# Patient Record
Sex: MALE | Race: WHITE | NOT HISPANIC OR LATINO
[De-identification: names, ages, dates, MRNs, and addresses within clinical notes are randomized per-mention and may not be internally consistent; named-entity substitution may affect disease eponyms.]

---

## 2017-01-12 ENCOUNTER — APPOINTMENT (OUTPATIENT)
Dept: FAMILY MEDICINE | Facility: CLINIC | Age: 72
End: 2017-01-12

## 2017-01-12 VITALS
HEART RATE: 64 BPM | BODY MASS INDEX: 27.39 KG/M2 | SYSTOLIC BLOOD PRESSURE: 110 MMHG | DIASTOLIC BLOOD PRESSURE: 60 MMHG | WEIGHT: 200 LBS | HEIGHT: 71.5 IN

## 2017-01-12 DIAGNOSIS — M25.532 PAIN IN LEFT WRIST: ICD-10-CM

## 2017-03-01 ENCOUNTER — NON-APPOINTMENT (OUTPATIENT)
Age: 72
End: 2017-03-01

## 2017-03-01 ENCOUNTER — APPOINTMENT (OUTPATIENT)
Dept: FAMILY MEDICINE | Facility: CLINIC | Age: 72
End: 2017-03-01

## 2017-03-01 VITALS
WEIGHT: 215 LBS | RESPIRATION RATE: 16 BRPM | HEIGHT: 71 IN | DIASTOLIC BLOOD PRESSURE: 66 MMHG | BODY MASS INDEX: 30.1 KG/M2 | HEART RATE: 66 BPM | OXYGEN SATURATION: 98 % | SYSTOLIC BLOOD PRESSURE: 132 MMHG

## 2017-03-01 DIAGNOSIS — Z87.891 PERSONAL HISTORY OF NICOTINE DEPENDENCE: ICD-10-CM

## 2017-03-01 DIAGNOSIS — H10.9 UNSPECIFIED CONJUNCTIVITIS: ICD-10-CM

## 2017-03-02 LAB
ALBUMIN SERPL ELPH-MCNC: 4.6 G/DL
ALP BLD-CCNC: 59 U/L
ALT SERPL-CCNC: 20 U/L
ANION GAP SERPL CALC-SCNC: 16 MMOL/L
AST SERPL-CCNC: 24 U/L
BILIRUB SERPL-MCNC: 0.6 MG/DL
BUN SERPL-MCNC: 13 MG/DL
CALCIUM SERPL-MCNC: 9.7 MG/DL
CHLORIDE SERPL-SCNC: 98 MMOL/L
CHOLEST SERPL-MCNC: 283 MG/DL
CHOLEST/HDLC SERPL: 5 RATIO
CO2 SERPL-SCNC: 24 MMOL/L
CREAT SERPL-MCNC: 1.01 MG/DL
GLUCOSE SERPL-MCNC: 118 MG/DL
HBA1C MFR BLD HPLC: 5.5 %
HDLC SERPL-MCNC: 57 MG/DL
LDLC SERPL CALC-MCNC: 173 MG/DL
POTASSIUM SERPL-SCNC: 4.1 MMOL/L
PROT SERPL-MCNC: 7.3 G/DL
SODIUM SERPL-SCNC: 138 MMOL/L
TRIGL SERPL-MCNC: 265 MG/DL

## 2017-03-10 ENCOUNTER — TRANSCRIPTION ENCOUNTER (OUTPATIENT)
Age: 72
End: 2017-03-10

## 2017-05-31 ENCOUNTER — RX RENEWAL (OUTPATIENT)
Age: 72
End: 2017-05-31

## 2017-06-16 ENCOUNTER — RX RENEWAL (OUTPATIENT)
Age: 72
End: 2017-06-16

## 2017-06-16 ENCOUNTER — APPOINTMENT (OUTPATIENT)
Dept: FAMILY MEDICINE | Facility: CLINIC | Age: 72
End: 2017-06-16

## 2017-06-16 ENCOUNTER — LABORATORY RESULT (OUTPATIENT)
Age: 72
End: 2017-06-16

## 2017-06-16 VITALS
DIASTOLIC BLOOD PRESSURE: 70 MMHG | SYSTOLIC BLOOD PRESSURE: 138 MMHG | HEIGHT: 71 IN | RESPIRATION RATE: 16 BRPM | OXYGEN SATURATION: 98 % | WEIGHT: 215 LBS | BODY MASS INDEX: 30.1 KG/M2 | TEMPERATURE: 97.6 F | HEART RATE: 76 BPM

## 2017-06-17 LAB — B BURGDOR IGG+IGM SER QL IB: NORMAL

## 2017-06-19 ENCOUNTER — RX RENEWAL (OUTPATIENT)
Age: 72
End: 2017-06-19

## 2017-09-18 ENCOUNTER — MED ADMIN CHARGE (OUTPATIENT)
Age: 72
End: 2017-09-18

## 2017-09-18 ENCOUNTER — APPOINTMENT (OUTPATIENT)
Dept: FAMILY MEDICINE | Facility: CLINIC | Age: 72
End: 2017-09-18
Payer: MEDICARE

## 2017-09-18 PROCEDURE — 90662 IIV NO PRSV INCREASED AG IM: CPT

## 2017-09-18 PROCEDURE — G0008: CPT

## 2018-01-02 ENCOUNTER — RX RENEWAL (OUTPATIENT)
Age: 73
End: 2018-01-02

## 2018-01-03 ENCOUNTER — RX RENEWAL (OUTPATIENT)
Age: 73
End: 2018-01-03

## 2018-02-24 ENCOUNTER — RX RENEWAL (OUTPATIENT)
Age: 73
End: 2018-02-24

## 2018-04-18 ENCOUNTER — RX RENEWAL (OUTPATIENT)
Age: 73
End: 2018-04-18

## 2018-04-19 ENCOUNTER — RX RENEWAL (OUTPATIENT)
Age: 73
End: 2018-04-19

## 2018-05-30 ENCOUNTER — APPOINTMENT (OUTPATIENT)
Dept: FAMILY MEDICINE | Facility: CLINIC | Age: 73
End: 2018-05-30
Payer: MEDICARE

## 2018-05-30 VITALS
WEIGHT: 193 LBS | DIASTOLIC BLOOD PRESSURE: 72 MMHG | BODY MASS INDEX: 27.02 KG/M2 | HEIGHT: 71 IN | SYSTOLIC BLOOD PRESSURE: 130 MMHG

## 2018-05-30 PROCEDURE — 99213 OFFICE O/P EST LOW 20 MIN: CPT

## 2018-05-30 NOTE — HISTORY OF PRESENT ILLNESS
[FreeTextEntry8] : tick bite removed 2 days ago from right thigh states prob short duration no fever rash or joint pain

## 2018-06-06 ENCOUNTER — RX RENEWAL (OUTPATIENT)
Age: 73
End: 2018-06-06

## 2018-06-25 ENCOUNTER — APPOINTMENT (OUTPATIENT)
Dept: FAMILY MEDICINE | Facility: CLINIC | Age: 73
End: 2018-06-25
Payer: MEDICARE

## 2018-06-25 ENCOUNTER — NON-APPOINTMENT (OUTPATIENT)
Age: 73
End: 2018-06-25

## 2018-06-25 VITALS
BODY MASS INDEX: 27.21 KG/M2 | HEIGHT: 71 IN | DIASTOLIC BLOOD PRESSURE: 80 MMHG | SYSTOLIC BLOOD PRESSURE: 130 MMHG | WEIGHT: 194.38 LBS

## 2018-06-25 DIAGNOSIS — E78.5 HYPERLIPIDEMIA, UNSPECIFIED: ICD-10-CM

## 2018-06-25 PROCEDURE — G0439: CPT

## 2018-06-25 PROCEDURE — 36415 COLL VENOUS BLD VENIPUNCTURE: CPT

## 2018-06-25 PROCEDURE — 93000 ELECTROCARDIOGRAM COMPLETE: CPT | Mod: 59

## 2018-06-25 RX ORDER — DOXYCYCLINE HYCLATE 100 MG/1
100 CAPSULE ORAL
Qty: 2 | Refills: 0 | Status: DISCONTINUED | COMMUNITY
Start: 2017-06-16 | End: 2018-06-25

## 2018-06-25 NOTE — PHYSICAL EXAM
[No Acute Distress] : no acute distress [No Lymphadenopathy] : no lymphadenopathy [Thyroid Normal, No Nodules] : the thyroid was normal and there were no nodules present [Regular Rhythm] : with a regular rhythm [No Murmur] : no murmur heard [No Carotid Bruits] : no carotid bruits [Pedal Pulses Present] : the pedal pulses are present [No Edema] : there was no peripheral edema [Soft] : abdomen soft [No HSM] : no HSM [No Hernias] : no hernias [Normal Sphincter Tone] : normal sphincter tone [Penis Abnormality] : normal circumcised penis [Testes Mass (___cm)] : there were no testicular masses [Prostate Tenderness] : the prostate was not tender [No Prostate Nodules] : no prostate nodules

## 2018-06-25 NOTE — HEALTH RISK ASSESSMENT
[Very Good] : ~his/her~  mood as very good [] : No [No falls in past year] : Patient reported no falls in the past year [0] : 2) Feeling down, depressed, or hopeless: Not at all (0) [de-identified] : Social [Patient reported colonoscopy was normal] : Patient reported colonoscopy was normal [Change in mental status noted] : No change in mental status noted [None] : None [Retired] : retired [] :  [Fully functional (bathing, dressing, toileting, transferring, walking, feeding)] : Fully functional (bathing, dressing, toileting, transferring, walking, feeding) [Fully functional (using the telephone, shopping, preparing meals, housekeeping, doing laundry, using] : Fully functional and needs no help or supervision to perform IADLs (using the telephone, shopping, preparing meals, housekeeping, doing laundry, using transportation, managing medications and managing finances) [Seat Belt] :  uses seat belt [ColonoscopyDate] : 2014 [Aggressive treatment] : aggressive treatment

## 2018-06-25 NOTE — HISTORY OF PRESENT ILLNESS
[de-identified] : Here for general checkup.\par \par Hypertension well controlled on current meds. Patient began Crestor last fall. Has not checked lipids since medication initiated. Patient walks regularly. No cardiovascular symptoms. Feels well. Nocturia x1 good stream. Colonoscopy up to date

## 2018-06-26 LAB
ALBUMIN SERPL ELPH-MCNC: 4.7 G/DL
ALP BLD-CCNC: 55 U/L
ALT SERPL-CCNC: 31 U/L
ANION GAP SERPL CALC-SCNC: 14 MMOL/L
AST SERPL-CCNC: 31 U/L
BILIRUB SERPL-MCNC: 0.6 MG/DL
BUN SERPL-MCNC: 21 MG/DL
CALCIUM SERPL-MCNC: 9.7 MG/DL
CHLORIDE SERPL-SCNC: 102 MMOL/L
CHOLEST SERPL-MCNC: 195 MG/DL
CHOLEST/HDLC SERPL: 2.3 RATIO
CO2 SERPL-SCNC: 25 MMOL/L
CREAT SERPL-MCNC: 0.96 MG/DL
GLUCOSE SERPL-MCNC: 84 MG/DL
HBA1C MFR BLD HPLC: 5.4 %
HDLC SERPL-MCNC: 86 MG/DL
LDLC SERPL CALC-MCNC: 96 MG/DL
POTASSIUM SERPL-SCNC: 4.1 MMOL/L
PROT SERPL-MCNC: 7.4 G/DL
SODIUM SERPL-SCNC: 141 MMOL/L
TRIGL SERPL-MCNC: 63 MG/DL

## 2018-07-10 ENCOUNTER — LABORATORY RESULT (OUTPATIENT)
Age: 73
End: 2018-07-10

## 2018-07-16 LAB — B BURGDOR IGG+IGM SER QL IB: NORMAL

## 2018-09-17 ENCOUNTER — APPOINTMENT (OUTPATIENT)
Dept: FAMILY MEDICINE | Facility: CLINIC | Age: 73
End: 2018-09-17
Payer: MEDICARE

## 2018-09-17 ENCOUNTER — MED ADMIN CHARGE (OUTPATIENT)
Age: 73
End: 2018-09-17

## 2018-09-17 VITALS
TEMPERATURE: 98.6 F | HEIGHT: 71 IN | RESPIRATION RATE: 16 BRPM | BODY MASS INDEX: 27.16 KG/M2 | WEIGHT: 194 LBS | OXYGEN SATURATION: 97 % | SYSTOLIC BLOOD PRESSURE: 124 MMHG | DIASTOLIC BLOOD PRESSURE: 60 MMHG | HEART RATE: 69 BPM

## 2018-09-17 PROCEDURE — 99213 OFFICE O/P EST LOW 20 MIN: CPT | Mod: 25

## 2018-09-17 RX ORDER — METHYLPRED ACET/NACL,ISO-OS/PF 40 MG/ML
40 VIAL (ML) INJECTION
Qty: 1 | Refills: 0 | Status: COMPLETED | OUTPATIENT
Start: 2018-09-17

## 2018-09-17 RX ADMIN — METHYLPREDNISOLONE ACETATE 40 MG/ML: 40 INJECTION, SUSPENSION INTRA-ARTICULAR; INTRALESIONAL; INTRAMUSCULAR; SOFT TISSUE at 00:00

## 2018-10-31 ENCOUNTER — APPOINTMENT (OUTPATIENT)
Dept: FAMILY MEDICINE | Facility: CLINIC | Age: 73
End: 2018-10-31
Payer: MEDICARE

## 2018-10-31 VITALS
TEMPERATURE: 98.4 F | SYSTOLIC BLOOD PRESSURE: 124 MMHG | OXYGEN SATURATION: 98 % | BODY MASS INDEX: 26.46 KG/M2 | WEIGHT: 189 LBS | RESPIRATION RATE: 16 BRPM | HEART RATE: 77 BPM | HEIGHT: 71 IN | DIASTOLIC BLOOD PRESSURE: 70 MMHG

## 2018-10-31 DIAGNOSIS — L23.7 ALLERGIC CONTACT DERMATITIS DUE TO PLANTS, EXCEPT FOOD: ICD-10-CM

## 2018-10-31 DIAGNOSIS — Z84.89 FAMILY HISTORY OF OTHER SPECIFIED CONDITIONS: ICD-10-CM

## 2018-10-31 DIAGNOSIS — L71.9 ROSACEA, UNSPECIFIED: ICD-10-CM
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
## 2018-10-31 DIAGNOSIS — H26.9 UNSPECIFIED CATARACT: ICD-10-CM

## 2018-10-31 PROCEDURE — 99214 OFFICE O/P EST MOD 30 MIN: CPT | Mod: 25

## 2018-10-31 PROCEDURE — G0008: CPT

## 2018-10-31 PROCEDURE — 90662 IIV NO PRSV INCREASED AG IM: CPT

## 2018-10-31 RX ORDER — ROSUVASTATIN CALCIUM 10 MG/1
10 TABLET, FILM COATED ORAL
Qty: 90 | Refills: 3 | Status: DISCONTINUED | COMMUNITY
Start: 2017-03-02 | End: 2018-10-31

## 2018-10-31 RX ORDER — METHYLPREDNISOLONE 4 MG/1
4 TABLET ORAL
Qty: 1 | Refills: 0 | Status: DISCONTINUED | COMMUNITY
Start: 2018-09-17 | End: 2018-10-31

## 2018-10-31 NOTE — HISTORY OF PRESENT ILLNESS
[No Pertinent Cardiac History] : no history of aortic stenosis, atrial fibrillation, coronary artery disease, recent myocardial infarction, or implantable device/pacemaker [No Pertinent Pulmonary History] : no history of asthma, COPD, sleep apnea, or smoking [No Adverse Anesthesia Reaction] : no adverse anesthesia reaction in self or family member [Chronic Kidney Disease] : no chronic kidney disease [Diabetes] : no diabetes [(Patient denies any chest pain, claudication, dyspnea on exertion, orthopnea, palpitations or syncope)] : Patient denies any chest pain, claudication, dyspnea on exertion, orthopnea, palpitations or syncope [FreeTextEntry1] : Bilateral intraocular lens replacement 11/ 12 and 11/26 [FreeTextEntry2] : 11/12 11/26 [FreeTextEntry3] : Dr Ace [FreeTextEntry4] : Poor vision. Patient to have intraocular lens replacement. Past medical history hypertension, well-controlled on current medications\par \par No cardiovascular disease. Patient walks 18 holes of golf several times a week without symptoms\par \par Patient has had spine surgery, and colonoscopies without complication

## 2018-10-31 NOTE — ASSESSMENT
[Patient Optimized for Surgery] : Patient optimized for surgery [FreeTextEntry4] : stable for surgery

## 2019-03-07 ENCOUNTER — RX RENEWAL (OUTPATIENT)
Age: 74
End: 2019-03-07

## 2019-03-08 ENCOUNTER — TRANSCRIPTION ENCOUNTER (OUTPATIENT)
Age: 74
End: 2019-03-08

## 2019-05-16 ENCOUNTER — INPATIENT (INPATIENT)
Facility: HOSPITAL | Age: 74
LOS: 1 days | Discharge: HOME | End: 2019-05-18
Attending: HOSPITALIST | Admitting: EMERGENCY MEDICINE
Payer: MEDICARE

## 2019-05-16 VITALS
HEIGHT: 68 IN | TEMPERATURE: 98 F | RESPIRATION RATE: 18 BRPM | OXYGEN SATURATION: 97 % | WEIGHT: 207.68 LBS | SYSTOLIC BLOOD PRESSURE: 141 MMHG | HEART RATE: 79 BPM | DIASTOLIC BLOOD PRESSURE: 79 MMHG

## 2019-05-16 DIAGNOSIS — I10 ESSENTIAL (PRIMARY) HYPERTENSION: ICD-10-CM

## 2019-05-16 DIAGNOSIS — Z29.9 ENCOUNTER FOR PROPHYLACTIC MEASURES, UNSPECIFIED: ICD-10-CM

## 2019-05-16 DIAGNOSIS — I21.3 ST ELEVATION (STEMI) MYOCARDIAL INFARCTION OF UNSPECIFIED SITE: ICD-10-CM

## 2019-05-16 LAB
ALBUMIN SERPL ELPH-MCNC: 4 G/DL — SIGNIFICANT CHANGE UP (ref 3.3–5)
ALP SERPL-CCNC: 54 U/L — SIGNIFICANT CHANGE UP (ref 40–120)
ALT FLD-CCNC: 27 U/L — SIGNIFICANT CHANGE UP (ref 12–78)
ANION GAP SERPL CALC-SCNC: 7 MMOL/L — SIGNIFICANT CHANGE UP (ref 5–17)
APTT BLD: 25.2 SEC — LOW (ref 27.5–36.3)
AST SERPL-CCNC: 21 U/L — SIGNIFICANT CHANGE UP (ref 15–37)
BILIRUB SERPL-MCNC: 0.7 MG/DL — SIGNIFICANT CHANGE UP (ref 0.2–1.2)
BLD GP AB SCN SERPL QL: SIGNIFICANT CHANGE UP
BUN SERPL-MCNC: 18 MG/DL — SIGNIFICANT CHANGE UP (ref 7–23)
CALCIUM SERPL-MCNC: 8.6 MG/DL — SIGNIFICANT CHANGE UP (ref 8.5–10.1)
CHLORIDE SERPL-SCNC: 107 MMOL/L — SIGNIFICANT CHANGE UP (ref 96–108)
CO2 SERPL-SCNC: 26 MMOL/L — SIGNIFICANT CHANGE UP (ref 22–31)
CREAT SERPL-MCNC: 0.98 MG/DL — SIGNIFICANT CHANGE UP (ref 0.5–1.3)
GLUCOSE SERPL-MCNC: 100 MG/DL — HIGH (ref 70–99)
HCT VFR BLD CALC: 39.4 % — SIGNIFICANT CHANGE UP (ref 39–50)
HGB BLD-MCNC: 13.8 G/DL — SIGNIFICANT CHANGE UP (ref 13–17)
INR BLD: 1.04 RATIO — SIGNIFICANT CHANGE UP (ref 0.88–1.16)
MCHC RBC-ENTMCNC: 33.2 PG — SIGNIFICANT CHANGE UP (ref 27–34)
MCHC RBC-ENTMCNC: 35 GM/DL — SIGNIFICANT CHANGE UP (ref 32–36)
MCV RBC AUTO: 94.7 FL — SIGNIFICANT CHANGE UP (ref 80–100)
PLATELET # BLD AUTO: 176 K/UL — SIGNIFICANT CHANGE UP (ref 150–400)
POTASSIUM SERPL-MCNC: 3.7 MMOL/L — SIGNIFICANT CHANGE UP (ref 3.5–5.3)
POTASSIUM SERPL-SCNC: 3.7 MMOL/L — SIGNIFICANT CHANGE UP (ref 3.5–5.3)
PROT SERPL-MCNC: 6.8 GM/DL — SIGNIFICANT CHANGE UP (ref 6–8.3)
PROTHROM AB SERPL-ACNC: 11.6 SEC — SIGNIFICANT CHANGE UP (ref 10–12.9)
RBC # BLD: 4.16 M/UL — LOW (ref 4.2–5.8)
RBC # FLD: 11.8 % — SIGNIFICANT CHANGE UP (ref 10.3–14.5)
SODIUM SERPL-SCNC: 140 MMOL/L — SIGNIFICANT CHANGE UP (ref 135–145)
TROPONIN I SERPL-MCNC: <0.015 NG/ML — SIGNIFICANT CHANGE UP (ref 0.01–0.04)
TYPE + AB SCN PNL BLD: SIGNIFICANT CHANGE UP
WBC # BLD: 7.37 K/UL — SIGNIFICANT CHANGE UP (ref 3.8–10.5)
WBC # FLD AUTO: 7.37 K/UL — SIGNIFICANT CHANGE UP (ref 3.8–10.5)

## 2019-05-16 PROCEDURE — 93010 ELECTROCARDIOGRAM REPORT: CPT | Mod: 76

## 2019-05-16 PROCEDURE — 99285 EMERGENCY DEPT VISIT HI MDM: CPT

## 2019-05-16 RX ORDER — METOPROLOL TARTRATE 50 MG
25 TABLET ORAL
Refills: 0 | Status: DISCONTINUED | OUTPATIENT
Start: 2019-05-16 | End: 2019-05-18

## 2019-05-16 RX ORDER — CLOPIDOGREL BISULFATE 75 MG/1
75 TABLET, FILM COATED ORAL DAILY
Refills: 0 | Status: DISCONTINUED | OUTPATIENT
Start: 2019-05-17 | End: 2019-05-18

## 2019-05-16 RX ORDER — HEPARIN SODIUM 5000 [USP'U]/ML
4000 INJECTION INTRAVENOUS; SUBCUTANEOUS ONCE
Refills: 0 | Status: DISCONTINUED | OUTPATIENT
Start: 2019-05-16 | End: 2019-05-16

## 2019-05-16 RX ORDER — ATORVASTATIN CALCIUM 80 MG/1
80 TABLET, FILM COATED ORAL AT BEDTIME
Refills: 0 | Status: DISCONTINUED | OUTPATIENT
Start: 2019-05-16 | End: 2019-05-18

## 2019-05-16 RX ORDER — ONDANSETRON 8 MG/1
4 TABLET, FILM COATED ORAL ONCE
Refills: 0 | Status: COMPLETED | OUTPATIENT
Start: 2019-05-16 | End: 2019-05-16

## 2019-05-16 RX ORDER — SODIUM CHLORIDE 9 MG/ML
1000 INJECTION INTRAMUSCULAR; INTRAVENOUS; SUBCUTANEOUS ONCE
Refills: 0 | Status: COMPLETED | OUTPATIENT
Start: 2019-05-16 | End: 2019-05-16

## 2019-05-16 RX ORDER — ASPIRIN/CALCIUM CARB/MAGNESIUM 324 MG
325 TABLET ORAL DAILY
Refills: 0 | Status: DISCONTINUED | OUTPATIENT
Start: 2019-05-17 | End: 2019-05-18

## 2019-05-16 RX ORDER — LISINOPRIL 2.5 MG/1
5 TABLET ORAL DAILY
Refills: 0 | Status: DISCONTINUED | OUTPATIENT
Start: 2019-05-16 | End: 2019-05-18

## 2019-05-16 RX ORDER — SODIUM CHLORIDE 9 MG/ML
1000 INJECTION INTRAMUSCULAR; INTRAVENOUS; SUBCUTANEOUS
Refills: 0 | Status: DISCONTINUED | OUTPATIENT
Start: 2019-05-16 | End: 2019-05-18

## 2019-05-16 RX ORDER — MORPHINE SULFATE 50 MG/1
2 CAPSULE, EXTENDED RELEASE ORAL ONCE
Refills: 0 | Status: DISCONTINUED | OUTPATIENT
Start: 2019-05-16 | End: 2019-05-16

## 2019-05-16 RX ORDER — CLOPIDOGREL BISULFATE 75 MG/1
600 TABLET, FILM COATED ORAL ONCE
Refills: 0 | Status: COMPLETED | OUTPATIENT
Start: 2019-05-16 | End: 2019-05-16

## 2019-05-16 RX ORDER — ACETAMINOPHEN 500 MG
650 TABLET ORAL EVERY 6 HOURS
Refills: 0 | Status: DISCONTINUED | OUTPATIENT
Start: 2019-05-16 | End: 2019-05-18

## 2019-05-16 RX ADMIN — MORPHINE SULFATE 2 MILLIGRAM(S): 50 CAPSULE, EXTENDED RELEASE ORAL at 15:42

## 2019-05-16 RX ADMIN — ONDANSETRON 4 MILLIGRAM(S): 8 TABLET, FILM COATED ORAL at 18:40

## 2019-05-16 RX ADMIN — Medication 25 MILLIGRAM(S): at 18:38

## 2019-05-16 RX ADMIN — ATORVASTATIN CALCIUM 80 MILLIGRAM(S): 80 TABLET, FILM COATED ORAL at 21:40

## 2019-05-16 RX ADMIN — SODIUM CHLORIDE 1000 MILLILITER(S): 9 INJECTION INTRAMUSCULAR; INTRAVENOUS; SUBCUTANEOUS at 15:36

## 2019-05-16 RX ADMIN — CLOPIDOGREL BISULFATE 600 MILLIGRAM(S): 75 TABLET, FILM COATED ORAL at 15:34

## 2019-05-16 NOTE — PROVIDER CONTACT NOTE (OTHER) - SITUATION
Dr. Hagan spoke with Dr. Dennis regarding EKG that was faxed by medical control with possible STEMI.

## 2019-05-16 NOTE — PROGRESS NOTE ADULT - SUBJECTIVE AND OBJECTIVE BOX
HPI: 74 y.o male with PMHx of HTN presented to ED by EMS with inferior wall STEMI. Pt reported to have chest pain associated with nausea and diaphoresis after returning from golf this morning. Pt denies any symptoms of chest pain, sob, palpitation, dizziness/ light headedness in the past.   In ED, initial trop was negative, stat EKG confirmed inferior wall STEMI. Called STEMI code and brought to Cath Lab urgently.     IN Cath lab, Pt was found to have thrombotic mid % stenosis. Pt is s/p thrombectomy, s/p PCI with TYSON x2 to mid and distal RCA.     ROS: + mild throat pain, denies chest pain/ pressure, SOB or palpitation     Physical exam:   General: awake, no acute distress   HEENT: NCAT, neck supple   CV: RRR, normal S1S2, no murmur/ rub   Pulmonary: clear, no wheezing or rales   GI: +BS, soft, non-tender, non-distended   : voiding freely   Extremities: no edema, + pedal pulses   Skin: no rashes or lesion. Rt. femoral access site (s/p perclose); no hematoma or bleeding     # Inferior wall STEMI, s/p TYSON x2 on RCA   - CCU monitor   - IV hydration  - Labs and EKG in am, Trop x3    - post procedure, outcome and follow up care reviewed with patient/family by Dr. Dennis   - start ASA 81 mg and Plavix 75 mg daily (given Plavix 600mg in ED and pt took  mg at home)   - start Lisinopril 5 mg daily   - start Metoprolol 25 mg BID   - start Atorvastatin 80 mg daily   - Echo in am   - follow up with cardiologist in am     Plan was discussed with Dr. Dennis and Hospitalist. HPI: 74 y.o male with PMHx of HTN presented to ED by EMS with inferior wall STEMI. Pt reported to have chest pain associated with nausea and diaphoresis after returning from golf this morning. Pt denies any symptoms of chest pain, sob, palpitation, dizziness/ light headedness in the past.   In ED, initial trop was negative, stat EKG confirmed inferior wall STEMI. Called STEMI code and brought to Cath Lab urgently.     IN Cath lab, Pt was found to have thrombotic distal % stenosis. Now, Pt is s/p thrombectomy, s/p PCI with TYSON x2 to mid and distal RCA.     ROS: + mild throat pain, denies chest pain/ pressure, SOB or palpitation     Physical exam:   General: awake, no acute distress   HEENT: NCAT, neck supple   CV: RRR, normal S1S2, no murmur/ rub   Pulmonary: clear, no wheezing or rales   GI: +BS, soft, non-tender, non-distended   : voiding freely   Extremities: no edema, + pedal pulses   Skin: no rashes or lesion. Rt. femoral access site (s/p perclose); no hematoma or bleeding     # Inferior wall STEMI, s/p TYSON x2 on RCA   - CCU monitor   - IV hydration  - Labs and EKG in am, Trop x3    - post procedure, outcome and follow up care reviewed with patient/family by Dr. Dennis   - start ASA 81 mg and Plavix 75 mg daily (given Plavix 600mg in ED and pt took  mg at home)   - start Lisinopril 5 mg daily   - start Metoprolol 25 mg BID   - start Atorvastatin 80 mg daily   - Echo in am   - follow up with cardiologist in am     Plan was discussed with Dr. Dennis and Hospitalist. HPI: 74 y.o male with PMHx of HTN presented to ED by EMS with inferior wall STEMI. Pt reported to have chest pain associated with nausea and diaphoresis after returning from golf this morning. Pt denies any symptoms of chest pain, sob, palpitation, dizziness/ light headedness in the past.   In ED, initial trop was negative, stat EKG confirmed inferior wall STEMI. Called STEMI code and brought to Cath Lab urgently (Bleeding risk: 1.0%).     IN Cath lab, Pt was found to have thrombotic distal % stenosis. Now, Pt is s/p thrombectomy, s/p PCI with TYSON x2 to mid and distal RCA.     ROS: + mild throat pain, denies chest pain/ pressure, SOB or palpitation     Physical exam:   General: awake, no acute distress   HEENT: NCAT, neck supple   CV: RRR, normal S1S2, no murmur/ rub   Pulmonary: clear, no wheezing or rales   GI: +BS, soft, non-tender, non-distended   : voiding freely   Extremities: no edema, + pedal pulses   Skin: no rashes or lesion. Rt. femoral access site (s/p perclose); no hematoma or bleeding     # Inferior wall STEMI, s/p TYSON x2 on RCA   - CCU monitor   - IV hydration  - Labs and EKG in am, Trop x3    - post procedure, outcome and follow up care reviewed with patient/family by Dr. Dennis   - start ASA 81 mg and Plavix 75 mg daily (given Plavix 600mg in ED and pt took  mg at home)   - start Lisinopril 5 mg daily   - start Metoprolol 25 mg BID   - start Atorvastatin 80 mg daily   - Echo in am   - follow up with cardiologist in am     Plan was discussed with Dr. Dennis and Hospitalist.

## 2019-05-16 NOTE — H&P ADULT - ASSESSMENT
74M w/PMH HTN, daily ETOH intake,  presented w/ substernal chest pain, radiating to his neck, associated w/ nausea and diaphoresis, pressure in quality, no prior similar episode.  He was BIBA and found w/ IWMI and taken to cath lab.  He is s/p PCI w/ TYSON x2 to mid and distal RCA.

## 2019-05-16 NOTE — H&P ADULT - HISTORY OF PRESENT ILLNESS
Chief Complaint:    HPI:      PAST MEDICAL & SURGICAL HISTORY:  HTN (hypertension)      Review of Systems:   CONSTITUTIONAL: No fever.  EYES: No eye pain or discharge.  ENMT:  No sinus or throat pain  NECK: No pain or stiffness  RESPIRATORY: No cough, wheezing, chills or hemoptysis; No shortness of breath  CARDIOVASCULAR: No chest pain, palpitations, dizziness, or leg swelling  GASTROINTESTINAL: No abdominal or epigastric pain. No nausea, vomiting, or hematemesis; No diarrhea or constipation. No melena or hematochezia.  GENITOURINARY: No dysuria or incontinence  NEUROLOGICAL: No headaches, memory loss, loss of strength, numbness, or tremors  SKIN: No rashes.  LYMPH NODES: No enlarged glands  ENDOCRINE: No heat or cold intolerance; No hair loss  MUSCULOSKELETAL: No joint pain or swelling; No muscle, back, or extremity pain  PSYCHIATRIC: No depression, anxiety, mood swings, or difficulty sleeping  HEME/LYMPH: No easy bruising, or bleeding gums  ALLERY AND IMMUNOLOGIC: No hives or eczema    Allergies    No Known Allergies    Intolerances        Social History:     FAMILY HISTORY:      Home Medications:      MEDICATIONS  (STANDING):  atorvastatin 80 milliGRAM(s) Oral at bedtime  heparin  Injectable 4000 Unit(s) IV Push once  lisinopril 5 milliGRAM(s) Oral daily  metoprolol tartrate 25 milliGRAM(s) Oral two times a day  ondansetron Injectable 4 milliGRAM(s) IV Push once  sodium chloride 0.9%. 1000 milliLiter(s) (100 mL/Hr) IV Continuous <Continuous>    MEDICATIONS  (PRN):  acetaminophen   Tablet .. 650 milliGRAM(s) Oral every 6 hours PRN Temp greater or equal to 38C (100.4F), Moderate Pain (4 - 6)  aluminum hydroxide/magnesium hydroxide/simethicone Suspension 30 milliLiter(s) Oral every 6 hours PRN Dyspepsia      CAPILLARY BLOOD GLUCOSE        I&O's Summary      PHYSICAL EXAM:  Vital Signs Last 24 Hrs  T(C): 36.7 (16 May 2019 15:22), Max: 36.7 (16 May 2019 15:22)  T(F): 98.1 (16 May 2019 15:22), Max: 98.1 (16 May 2019 15:22)  HR: 74 (16 May 2019 18:10) (71 - 79)  BP: 127/75 (16 May 2019 18:10) (126/67 - 154/85)  BP(mean): --  RR: 16 (16 May 2019 18:10) (16 - 18)  SpO2: 97% (16 May 2019 18:10) (97% - 100%)  GENERAL: NAD, well-developed  HEAD:  Atraumatic, Normocephalic  EYES: EOMI, PERRLA, conjunctiva and sclera clear  NECK: Supple, No JVD  CHEST/LUNG: Clear to auscultation bilaterally; No wheeze  HEART: Regular rate and rhythm; No murmurs, rubs, or gallops  ABDOMEN: Soft, Nontender, Nondistended; Bowel sounds present  EXTREMITIES:  2+ Peripheral Pulses, No clubbing, cyanosis, or edema  PSYCH: AAOx3  NEUROLOGY: non-focal  SKIN: No rashes or lesions    LABS:                        13.8   7.37  )-----------( 176      ( 16 May 2019 15:23 )             39.4     05-16    140  |  107  |  18  ----------------------------<  100<H>  3.7   |  26  |  0.98    Ca    8.6      16 May 2019 15:23    TPro  6.8  /  Alb  4.0  /  TBili  0.7  /  DBili  x   /  AST  21  /  ALT  27  /  AlkPhos  54  05-16    PT/INR - ( 16 May 2019 15:23 )   PT: 11.6 sec;   INR: 1.04 ratio         PTT - ( 16 May 2019 15:23 )  PTT:25.2 sec  CARDIAC MARKERS ( 16 May 2019 15:23 )  <0.015 ng/mL / x     / x     / x     / x              RADIOLOGY & ADDITIONAL TESTS:    Imaging Personally Reviewed:  n/a  EKG Personally Reviewed:  Acute inferior STEMI  Consultant(s) Notes Reviewed:    Cardio  Care Discussed with Consultants/Other Providers:  Cath NP HPI:  74M w/PMH HTN, daily ETOH intake,  presented w/ substernal chest pain, radiating to his neck, associated w/ nausea and diaphoresis, pressure in quality, no prior similar episode. Pt plays gold regularly and he was playing this morning w/out issues. 20 minutes after he got home symptoms started.  Wife called EMS and was instructed to take asa 324mg.  He was BIBA and found w/ IWMI and taken to cath lab.  He is s/p PCI w/ TYSON x2 to mid and distal RCA.  He's currently in CCU and doing well, no c/o, wife is present.  Case d/w cath NP.      PAST MEDICAL & SURGICAL HISTORY:  HTN (hypertension)  s/p BCC of face and arm removed  s/p back surgery   s/p Cataract  S/P gum surgery 5/15/19      Review of Systems:   CONSTITUTIONAL: No fever.  EYES: No eye pain or discharge.  ENMT:  No sinus or throat pain  NECK: No pain or stiffness  RESPIRATORY: No cough, wheezing, chills or hemoptysis; No shortness of breath  CARDIOVASCULAR: SEE HPI  GASTROINTESTINAL: No abdominal or epigastric pain. No nausea, vomiting, or hematemesis; No diarrhea or constipation. No melena or hematochezia.  GENITOURINARY: No dysuria or incontinence  NEUROLOGICAL: No headaches, memory loss, loss of strength, numbness, or tremors  SKIN: No rashes.  MUSCULOSKELETAL: No joint pain or swelling; No muscle, back, or extremity pain  PSYCHIATRIC: No depression, anxiety, mood swings, or difficulty sleeping      Allergies    No Known Allergies      Social History:   denies smoking, drug use  daily 4-5 glasses of wine    FAMILY HISTORY:  denies any FMx of CAD    Home Medications:  hctz/losartan- unknown dose     MEDICATIONS  (STANDING):  atorvastatin 80 milliGRAM(s) Oral at bedtime  heparin  Injectable 4000 Unit(s) IV Push once  lisinopril 5 milliGRAM(s) Oral daily  metoprolol tartrate 25 milliGRAM(s) Oral two times a day  ondansetron Injectable 4 milliGRAM(s) IV Push once  sodium chloride 0.9%. 1000 milliLiter(s) (100 mL/Hr) IV Continuous <Continuous>    MEDICATIONS  (PRN):  acetaminophen   Tablet .. 650 milliGRAM(s) Oral every 6 hours PRN Temp greater or equal to 38C (100.4F), Moderate Pain (4 - 6)  aluminum hydroxide/magnesium hydroxide/simethicone Suspension 30 milliLiter(s) Oral every 6 hours PRN Dyspepsia      PHYSICAL EXAM:  Vital Signs Last 24 Hrs  T(C): 36.7 (16 May 2019 15:22), Max: 36.7 (16 May 2019 15:22)  T(F): 98.1 (16 May 2019 15:22), Max: 98.1 (16 May 2019 15:22)  HR: 74 (16 May 2019 18:10) (71 - 79)  BP: 127/75 (16 May 2019 18:10) (126/67 - 154/85)  RR: 16 (16 May 2019 18:10) (16 - 18)  SpO2: 97% (16 May 2019 18:10) (97% - 100%)  GENERAL: NAD, well-developed  HEAD:  Atraumatic, Normocephalic  EYES: EOMI, PERRLA, conjunctiva and sclera clear  ENT: normal hearing, no nasal discharge, throat clear, normal dentition, Left cheek swelling   NECK: Supple, No JVD, no LAD, no thyromegaly   CHEST/LUNG: Clear to auscultation bilaterally; No wheeze, resp unlabored  HEART: Regular rate and rhythm; No murmurs, rubs, or gallops  ABDOMEN: Soft, Nontender, Nondistended; Bowel sounds present, no HSM  EXTREMITIES:  2+ Peripheral Pulses, No clubbing, cyanosis, or edema  PSYCH: AAOx3, normal behavior  NEUROLOGY: non-focal, sensory and cn 2-12intact  SKIN: No visible rashes or lesions    LABS:                        13.8   7.37  )-----------( 176      ( 16 May 2019 15:23 )             39.4     05-16    140  |  107  |  18  ----------------------------<  100<H>  3.7   |  26  |  0.98    Ca    8.6      16 May 2019 15:23    TPro  6.8  /  Alb  4.0  /  TBili  0.7  /  DBili  x   /  AST  21  /  ALT  27  /  AlkPhos  54  05-16    PT/INR - ( 16 May 2019 15:23 )   PT: 11.6 sec;   INR: 1.04 ratio         PTT - ( 16 May 2019 15:23 )  PTT:25.2 sec  CARDIAC MARKERS ( 16 May 2019 15:23 )  <0.015 ng/mL / x     / x     / x     / x              RADIOLOGY & ADDITIONAL TESTS:    Imaging Personally Reviewed:  n/a  EKG Personally Reviewed:  Acute inferior STEMI  Consultant(s) Notes Reviewed:    Cardio  Care Discussed with Consultants/Other Providers:  Cath NP

## 2019-05-16 NOTE — CONSULT NOTE ADULT - SUBJECTIVE AND OBJECTIVE BOX
Patient is a 74y old  Male who presents with a chief complaint of     HPI:  75 yo male with chest pain and EKG showing inferior STEMI      PAST MEDICAL & SURGICAL HISTORY:  HTN (hypertension)      PREVIOUS CARDIAC WORKUP:  None      ALLERGIES:    No Known Allergies       MEDICATIONS  (STANDING):  heparin  Injectable 4000 Unit(s) IV Push once  sodium chloride 0.9%. 1000 milliLiter(s) (100 mL/Hr) IV Continuous <Continuous>    MEDICATIONS  (PRN):  acetaminophen   Tablet .. 650 milliGRAM(s) Oral every 6 hours PRN Temp greater or equal to 38C (100.4F), Moderate Pain (4 - 6)  aluminum hydroxide/magnesium hydroxide/simethicone Suspension 30 milliLiter(s) Oral every 6 hours PRN Dyspepsia      FAMILY HISTORY:  NC        SOCIAL HISTORY:  Ex smoker. No ETOH abuse. No illicit drugs. Quit smoking 1974    ROS:     Detailed ten system ROS was performed and was negative except for history as eluded to above.    no fever  no chills  no nausea  no vomiting  no diarrhea  no constipation  no melena  no hematochezia  + chest pain  no palpitations  no sob at rest  no dyspnea on exertion  no cough  no wheezing  no anorexia  no headache  no dizziness  no syncope  no weakness  no myalgia  no dysuria  no polyuria  no hematuria     Vital Signs Last 24 Hrs  T(C): 36.7 (16 May 2019 15:22), Max: 36.7 (16 May 2019 15:22)  T(F): 98.1 (16 May 2019 15:22), Max: 98.1 (16 May 2019 15:22)  HR: 77 (16 May 2019 15:42) (73 - 79)  BP: 154/85 (16 May 2019 15:42) (141/79 - 154/85)  RR: 18 (16 May 2019 15:42) (18 - 18)  SpO2: 100% (16 May 2019 15:42) (97% - 100%)    I&O's Summary      PHYSICAL EXAM:    General:                Comfortable, AAO X 3, in no distress.  HEENT:                  Atraumatic, PERRLA, EOMI, conjunctiva clear.    Neck:                     Supple, no adenopathy, no thyromegaly, no JVD, no bruit.  Back:                     Symmetric, non tender.  Chest:                    Clear, B/L symmetric air entry, no tachypnea  Heart:                     S1, S2 normal, no gallop, no murmur.  Abdomen:              Soft, non-tender, bowel sounds active. No palpable masses.  Extremities:           no cyanosis, no edema. Peripheral pulses normal.  Skin:                      Skin color, texture normal. No rashes.  Neurologic:            Grossly nonfocal.       TELEMETRY:  Normal sinus rhythm with no tachy or claire events     ECG:  NSR, inferior STEMI    LABS:                          13.8   7.37  )-----------( 176      ( 16 May 2019 15:23 )             39.4     05-16    140  |  107  |  18  ----------------------------<  100<H>  3.7   |  26  |  0.98    Ca    8.6      16 May 2019 15:23    TPro  6.8  /  Alb  4.0  /  TBili  0.7  /  DBili  x   /  AST  21  /  ALT  27  /  AlkPhos  54  05-16 05-16 @ 15:23  Trop-I  <0.015      PT/INR - ( 16 May 2019 15:23 )   PT: 11.6 sec;   INR: 1.04 ratio    PTT - ( 16 May 2019 15:23 )  PTT:25.2 sec    RADIOLOGY & ADDITIONAL STUDIES:   Pending

## 2019-05-16 NOTE — ED PROVIDER NOTE - OBJECTIVE STATEMENT
73 y/o male with a PMHx of HTN presents to the ED c/o sudden onset of chest pain since a half an hour PTA. CP is associated with nausea, diaphoresis. PTA pt was given and ASA and NTG. Pt states that he has no hx of CAD, no cardiologist to Follow up with. Code STEMI was called prior to arrival.

## 2019-05-16 NOTE — SBIRT NOTE. - NSSBIRTFULLSCREEN_GEN_A_ED_FT
Meeting with patient attempted however Full Screen Not Performed due to    Severity of illness; admitted to CATH LAB.

## 2019-05-16 NOTE — PACU DISCHARGE NOTE - COMMENTS
Report given to Latrice Hay by Analy LOW.  Pt is alert and oriented x 3. vital signs stable. monitor pattern nsr.  Right groin with tegaderm dsg clean dry and intact. no bleeding or hematoma noted. Pt denies pain or discomfort.  safety maintained.  Pt left with life kain attached accompanied by RN

## 2019-05-16 NOTE — PROGRESS NOTE ADULT - ASSESSMENT
Successful TYSON of the right coronary artery     Continue DAPT  Beta blockers  Statins.    Echo in AM  Expect 2 days stay and D/C on 5/18/19

## 2019-05-16 NOTE — ED ADULT NURSE NOTE - OBJECTIVE STATEMENT
pt c/o CP radiating to jaw and neck beginning around 245p as well as cold sweats and nausea. hx- HTN Code Stemi called on arrival. pre-notification from [facility], 325mg of ASA given PTA

## 2019-05-16 NOTE — ED PROVIDER NOTE - PROGRESS NOTE DETAILS
Nicolas AS for Dr. Hagan: Spoke to Dr. Dennis, activated cath labs, Dr. Dennis is on the way, advises to give Plavix, Heparin, consider beta blocker.

## 2019-05-16 NOTE — CONSULT NOTE ADULT - ASSESSMENT
Acute inferior STEMI  h/o HTN    Suggest:    Cardiac monitor  O2 supplement  Follow up Cardiac enzymes  Treat with aspirin, Plavix  IV Heparin bolus given in ER  Beta blockers  Statins  Echocardiogram  Ischemia evaluation - Based on pt's symptoms, history, risk factors, exam, lab and EKG data I have advised pt have a cardiac catheterization and possible percutaneous coronary intervention. Procedure, its risks, alternatives, benefits and potential complications were discussed in detail. Risks include but not limited to bleeding, infection, allergy, renal failure requiring dialysis, stroke, vascular injury, pericardial tamponade, arrhythmias, MI and even death. Pt is agreeable and has consented for the procedure and the procedure is being scheduled urgently.  Admit to CCU. Further treatment orders after cardiac cath, meanwhile continue current treatment with aspirin and plavix. Keep pain free with Morphine as needed and tolerated. Keep optimal BP and HR.

## 2019-05-16 NOTE — ED PROVIDER NOTE - NS ED MD DISPO ISOLATION TYPES
Quality 130: Documentation Of Current Medications In The Medical Record: Current Medications Documented
Quality 402: Tobacco Use And Help With Quitting Among Adolescents: Patient screened for tobacco and never smoked
Detail Level: Zone
Quality 431: Preventive Care And Screening: Unhealthy Alcohol Use - Screening: Patient screened for unhealthy alcohol use using a single question and scores less than 2 times per year
Quality 110: Preventive Care And Screening: Influenza Immunization: Influenza immunization was not ordered or administered, reason not given
None

## 2019-05-16 NOTE — H&P ADULT - PROBLEM SELECTOR PLAN 1
s/p PCI w/ TYSON x2  admit to CCU  check serial trops and AM TTE  DAPT, bb, statin, lisinopril   cardio f/u

## 2019-05-16 NOTE — ED ADULT TRIAGE NOTE - CHIEF COMPLAINT QUOTE
Pt with Chest Pain radiating to left neck and jaw with diaphoresis after playing golf.   Pre-notification from EMS for possible STEMI.  Pt took 324 baby ASA at home, received 1SL NTG by EMS and 200mL NS through left hand 20 gauge IV.

## 2019-05-16 NOTE — ED PROVIDER NOTE - CONSTITUTIONAL, MLM
normal... Awake, alert, oriented to person, place, time/situation. + mildly uncomfortable, mildly diaphoretic

## 2019-05-17 LAB
ADD ON TEST-SPECIMEN IN LAB: SIGNIFICANT CHANGE UP
ANION GAP SERPL CALC-SCNC: 6 MMOL/L — SIGNIFICANT CHANGE UP (ref 5–17)
BASOPHILS # BLD AUTO: 0.03 K/UL — SIGNIFICANT CHANGE UP (ref 0–0.2)
BASOPHILS NFR BLD AUTO: 0.3 % — SIGNIFICANT CHANGE UP (ref 0–2)
BUN SERPL-MCNC: 20 MG/DL — SIGNIFICANT CHANGE UP (ref 7–23)
CALCIUM SERPL-MCNC: 8.6 MG/DL — SIGNIFICANT CHANGE UP (ref 8.5–10.1)
CHLORIDE SERPL-SCNC: 107 MMOL/L — SIGNIFICANT CHANGE UP (ref 96–108)
CHOLEST SERPL-MCNC: 259 MG/DL — HIGH (ref 10–199)
CO2 SERPL-SCNC: 25 MMOL/L — SIGNIFICANT CHANGE UP (ref 22–31)
CREAT SERPL-MCNC: 1.03 MG/DL — SIGNIFICANT CHANGE UP (ref 0.5–1.3)
EOSINOPHIL # BLD AUTO: 0.06 K/UL — SIGNIFICANT CHANGE UP (ref 0–0.5)
EOSINOPHIL NFR BLD AUTO: 0.6 % — SIGNIFICANT CHANGE UP (ref 0–6)
GLUCOSE SERPL-MCNC: 101 MG/DL — HIGH (ref 70–99)
HCT VFR BLD CALC: 42.2 % — SIGNIFICANT CHANGE UP (ref 39–50)
HDLC SERPL-MCNC: 66 MG/DL — SIGNIFICANT CHANGE UP
HGB BLD-MCNC: 14.7 G/DL — SIGNIFICANT CHANGE UP (ref 13–17)
IMM GRANULOCYTES NFR BLD AUTO: 0.3 % — SIGNIFICANT CHANGE UP (ref 0–1.5)
LIPID PNL WITH DIRECT LDL SERPL: 173 MG/DL — HIGH
LYMPHOCYTES # BLD AUTO: 1.41 K/UL — SIGNIFICANT CHANGE UP (ref 1–3.3)
LYMPHOCYTES # BLD AUTO: 15.2 % — SIGNIFICANT CHANGE UP (ref 13–44)
MAGNESIUM SERPL-MCNC: 2.5 MG/DL — SIGNIFICANT CHANGE UP (ref 1.6–2.6)
MCHC RBC-ENTMCNC: 33.1 PG — SIGNIFICANT CHANGE UP (ref 27–34)
MCHC RBC-ENTMCNC: 34.8 GM/DL — SIGNIFICANT CHANGE UP (ref 32–36)
MCV RBC AUTO: 95 FL — SIGNIFICANT CHANGE UP (ref 80–100)
MONOCYTES # BLD AUTO: 0.9 K/UL — SIGNIFICANT CHANGE UP (ref 0–0.9)
MONOCYTES NFR BLD AUTO: 9.7 % — SIGNIFICANT CHANGE UP (ref 2–14)
NEUTROPHILS # BLD AUTO: 6.83 K/UL — SIGNIFICANT CHANGE UP (ref 1.8–7.4)
NEUTROPHILS NFR BLD AUTO: 73.9 % — SIGNIFICANT CHANGE UP (ref 43–77)
PLATELET # BLD AUTO: 191 K/UL — SIGNIFICANT CHANGE UP (ref 150–400)
POTASSIUM SERPL-MCNC: 3.9 MMOL/L — SIGNIFICANT CHANGE UP (ref 3.5–5.3)
POTASSIUM SERPL-SCNC: 3.9 MMOL/L — SIGNIFICANT CHANGE UP (ref 3.5–5.3)
RBC # BLD: 4.44 M/UL — SIGNIFICANT CHANGE UP (ref 4.2–5.8)
RBC # FLD: 11.9 % — SIGNIFICANT CHANGE UP (ref 10.3–14.5)
SODIUM SERPL-SCNC: 138 MMOL/L — SIGNIFICANT CHANGE UP (ref 135–145)
TOTAL CHOLESTEROL/HDL RATIO MEASUREMENT: 3.9 RATIO — SIGNIFICANT CHANGE UP (ref 3.4–9.6)
TRIGL SERPL-MCNC: 100 MG/DL — SIGNIFICANT CHANGE UP (ref 10–149)
TROPONIN I SERPL-MCNC: 20.8 NG/ML — HIGH (ref 0.01–0.04)
TROPONIN I SERPL-MCNC: 32.4 NG/ML — HIGH (ref 0.01–0.04)
TROPONIN I SERPL-MCNC: 32.9 NG/ML — HIGH (ref 0.01–0.04)
WBC # BLD: 9.26 K/UL — SIGNIFICANT CHANGE UP (ref 3.8–10.5)
WBC # FLD AUTO: 9.26 K/UL — SIGNIFICANT CHANGE UP (ref 3.8–10.5)

## 2019-05-17 PROCEDURE — 93306 TTE W/DOPPLER COMPLETE: CPT | Mod: 26

## 2019-05-17 PROCEDURE — 93010 ELECTROCARDIOGRAM REPORT: CPT

## 2019-05-17 RX ADMIN — Medication 25 MILLIGRAM(S): at 17:47

## 2019-05-17 RX ADMIN — LISINOPRIL 5 MILLIGRAM(S): 2.5 TABLET ORAL at 10:04

## 2019-05-17 RX ADMIN — Medication 25 MILLIGRAM(S): at 06:13

## 2019-05-17 RX ADMIN — CLOPIDOGREL BISULFATE 75 MILLIGRAM(S): 75 TABLET, FILM COATED ORAL at 10:04

## 2019-05-17 RX ADMIN — ATORVASTATIN CALCIUM 80 MILLIGRAM(S): 80 TABLET, FILM COATED ORAL at 22:15

## 2019-05-17 RX ADMIN — Medication 325 MILLIGRAM(S): at 10:04

## 2019-05-17 NOTE — PROGRESS NOTE ADULT - SUBJECTIVE AND OBJECTIVE BOX
cc: cp  HPI: 74y male w/ pmh htn, etoh use p/w chest pain.  Now s/p PCI w/ TYSON x 2 to mid and distal RCA.   No complaints this morning.  No cp, sob, palp, no abd pain or groin pain. Ambulating, eating.     ros- as per hpi above, 10 point ros neg    Vital Signs Last 24 Hrs  T(C): 37.1 (17 May 2019 08:28), Max: 37.1 (17 May 2019 08:28)  T(F): 98.7 (17 May 2019 08:28), Max: 98.7 (17 May 2019 08:28)  HR: 68 (17 May 2019 13:00) (55 - 79)  BP: 120/64 (17 May 2019 13:00) (103/57 - 154/85)  BP(mean): 83 (17 May 2019 10:00) (69 - 97)  RR: 16 (17 May 2019 13:00) (9 - 22)  SpO2: 98% (17 May 2019 13:00) (94% - 100%)      PHYSICAL EXAM:  General: NAD, comfortable  Neuro: AAOx3  HEENT: NCAT,   Neck: supple   Respiratory: CTA b/l  Cardiovascular: S1 and S2, RRR  Gastrointestinal: soft, non ttp   Extremities: No edema; no groin ttp   Vascular: 2+ peripheral pulses  Musculoskeletal: full rom         LABS: All Labs Reviewed:                        14.7   9.26  )-----------( 191      ( 17 May 2019 07:42 )             42.2     05-17    138  |  107  |  20  ----------------------------<  101<H>  3.9   |  25  |  1.03    Ca    8.6      17 May 2019 07:42  Mg     2.5     05-17    TPro  6.8  /  Alb  4.0  /  TBili  0.7  /  DBili  x   /  AST  21  /  ALT  27  /  AlkPhos  54  05-16    PT/INR - ( 16 May 2019 15:23 )   PT: 11.6 sec;   INR: 1.04 ratio         PTT - ( 16 May 2019 15:23 )  PTT:25.2 sec  CARDIAC MARKERS ( 17 May 2019 07:42 )  32.900 ng/mL / x     / x     / x     / x      CARDIAC MARKERS ( 17 May 2019 00:21 )  32.400 ng/mL / x     / x     / x     / x      CARDIAC MARKERS ( 16 May 2019 15:23 )  <0.015 ng/mL / x     / x     / x     / x          MEDICATIONS  (STANDING):  aspirin enteric coated 325 milliGRAM(s) Oral daily  atorvastatin 80 milliGRAM(s) Oral at bedtime  clopidogrel Tablet 75 milliGRAM(s) Oral daily  lisinopril 5 milliGRAM(s) Oral daily  metoprolol tartrate 25 milliGRAM(s) Oral two times a day  sodium chloride 0.9%. 1000 milliLiter(s) (100 mL/Hr) IV Continuous <Continuous>    MEDICATIONS  (PRN):  acetaminophen   Tablet .. 650 milliGRAM(s) Oral every 6 hours PRN Temp greater or equal to 38C (100.4F), Moderate Pain (4 - 6)  aluminum hydroxide/magnesium hydroxide/simethicone Suspension 30 milliLiter(s) Oral every 6 hours PRN Dyspepsia      ASSESSMENT and PLAN:    1. acute  STEMI s/p PCI, TYSON x 2 to RCA  - aspirin, plavix, statin, BB, ACEi  - Echo today  - Cardio f/u appreciated- d/c planning likely in am     2. htn  - controlled on meds      dvt px  - ambulating

## 2019-05-17 NOTE — PROGRESS NOTE ADULT - ASSESSMENT
74M w/PMH HTN, daily ETOH intake,  presented w/ substernal chest pain, radiating to his neck, associated w/ nausea and diaphoresis, pressure in quality, no prior similar episode. Pt plays gold regularly and he was playing this morning w/out issues. 20 minutes after he got home symptoms started.  Wife called EMS and was instructed to take asa 324mg.  He was BIBA and found w/ IWMI and taken to cath lab.  He is s/p PCI w/ TYSON x2 to mid and distal RCA.  He's currently in CCU and doing well, no c/o, wife is present.      -Encourage PO fluids  -Aggressive medical management of coronary artery disease and its underlying risk factors.  -ASA 325mg  -Plavix 75mg  -Lisinopril 5mg  -Lopressor 25mg  -Lipitor 80mg   -Plan of care D/W pt. and MD  -Discussed therapeutic lifestyle changes to reduce risk factors such as following a cardiac diet, weight loss, maintaining a healthy weight, exercise, smoking cessation, medication compliance, and regular follow-up  with MD to know our numbers (BP, cholesterol, weight, and glucose  -Follow-up with attending/ cardiologist 74M w/PMH HTN, daily ETOH intake,  presented w/ substernal chest pain, radiating to his neck, associated w/ nausea and diaphoresis, pressure in quality, no prior similar episode. Pt plays gold regularly and he was playing this morning w/out issues. 20 minutes after he got home symptoms started.  Wife called EMS and was instructed to take asa 324mg.  He was BIBA and found w/ IWMI and taken to cath lab.  He is s/p PCI w/ TYSON x2 to mid and distal RCA.  He's currently in CCU and doing well, no c/o, wife is present.      -Encourage PO fluids  -Aggressive medical management of coronary artery disease and its underlying risk factors.  -ASA 325mg  -Plavix 75mg  -Lisinopril 5mg  -Lopressor 25mg  -Lipitor 80mg   -Out of bed to chair, ambulate  -Echocardiogram today to evaluate LV function and wall motion  -Plan of care D/W pt. and MD  -Discussed therapeutic lifestyle changes to reduce risk factors such as following a cardiac diet, weight loss, maintaining a healthy weight, exercise, smoking cessation, medication compliance, and regular follow-up  with MD to know our numbers (BP, cholesterol, weight, and glucose  -Follow-up with attending/ cardiologist

## 2019-05-17 NOTE — PROGRESS NOTE ADULT - SUBJECTIVE AND OBJECTIVE BOX
73 yo male with chest pain and EKG showing inferior STEMI. Cardiac cath done. TYSON of the mid and distal RCA.     Today, no chest pain.  Overall feeling well.     PAST MEDICAL & SURGICAL HISTORY:  HTN (hypertension)    CURRENT CARDIAC WORKUP:       Cardiac Cath: 5/16/19 TYSON of mid and distal RCA, PTCA of ostial RPDA. 50% mid LAD and ostial D1    Allergies:   No Known Allergies      MEDICATIONS  (STANDING):  aspirin enteric coated 325 milliGRAM(s) Oral daily  atorvastatin 80 milliGRAM(s) Oral at bedtime  clopidogrel Tablet 75 milliGRAM(s) Oral daily  lisinopril 5 milliGRAM(s) Oral daily  metoprolol tartrate 25 milliGRAM(s) Oral two times a day  sodium chloride 0.9%. 1000 milliLiter(s) (100 mL/Hr) IV Continuous <Continuous>    MEDICATIONS  (PRN):  acetaminophen   Tablet .. 650 milliGRAM(s) Oral every 6 hours PRN Temp greater or equal to 38C (100.4F), Moderate Pain (4 - 6)  aluminum hydroxide/magnesium hydroxide/simethicone Suspension 30 milliLiter(s) Oral every 6 hours PRN Dyspepsia      ROS:     Detailed ten system ROS was performed and was negative except for history as eluded to above.    no fever  no chills  no nausea  no vomiting  no diarrhea  no constipation  no melena  no hematochezia  no chest pain  no palpitations  no sob at rest  no dyspnea on exertion  no cough  no wheezing  no anorexia  no headache  no dizziness  no syncope  no weakness  no myalgia  no dysuria  no polyuria  no hematuria       Vital Signs Last 24 Hrs  T(C): 36.3 (17 May 2019 00:39), Max: 36.7 (16 May 2019 15:22)  T(F): 97.3 (17 May 2019 00:39), Max: 98.1 (16 May 2019 15:22)  HR: 66 (17 May 2019 03:00) (66 - 79)  BP: 116/65 (17 May 2019 03:00) (106/64 - 154/85)  BP(mean): 78 (17 May 2019 03:00) (72 - 85)  RR: 11 (17 May 2019 03:00) (9 - 18)  SpO2: 94% (17 May 2019 03:00) (94% - 100%)    I&O's Summary    16 May 2019 07:01  -  17 May 2019 05:12  --------------------------------------------------------  IN: 700 mL / OUT: 0 mL / NET: 700 mL        PHYSICAL EXAM:    General:                Comfortable, AAO X 3, in no distress.  HEENT:                  Atraumatic, PERRLA, EOMI, conjunctiva clear.    Neck:                     Supple, no adenopathy, no thyromegaly, no JVD, no bruit.  Back:                     Symmetric, non tender.  Chest:                    Clear, B/L symmetric air entry, no tachypnea  Heart:                     S1, S2 normal, no gallop, no murmur.  Abdomen:              Soft, non-tender, bowel sounds active. No palpable masses.  Extremities:           no cyanosis, no edema. Peripheral pulses normal. Right groin Ok  Skin:                      Skin color, texture normal. No rashes.  Neurologic:            Grossly nonfocal.       TELEMETRY:  Normal sinus rhythm with no tachy or claire events     ECG:  NSR, inferior infarct    LABS:                        13.8   7.37  )-----------( 176      ( 16 May 2019 15:23 )             39.4     05-16    140  |  107  |  18  ----------------------------<  100<H>  3.7   |  26  |  0.98    Ca    8.6      16 May 2019 15:23    TPro  6.8  /  Alb  4.0  /  TBili  0.7  /  DBili  x   /  AST  21  /  ALT  27  /  AlkPhos  54  05-16 05-17 @ 00:21  Trop-I 32.400    05-16 @ 15:23  Trop-I <0.015      PT/INR - ( 16 May 2019 15:23 )   PT: 11.6 sec;   INR: 1.04 ratio    PTT - ( 16 May 2019 15:23 )  PTT:25.2 sec 75 yo male with chest pain and EKG showing inferior STEMI. Cardiac cath done. TYSON of the mid and distal RCA.     Today, no chest pain.  Overall feeling well.     PAST MEDICAL & SURGICAL HISTORY:  HTN (hypertension)    CURRENT CARDIAC WORKUP:       Cardiac Cath: 5/16/19 TYSON of mid and distal RCA, PTCA of ostial RPDA. 50% mid LAD and ostial D1    Allergies:   No Known Allergies      MEDICATIONS  (STANDING):  aspirin enteric coated 325 milliGRAM(s) Oral daily  atorvastatin 80 milliGRAM(s) Oral at bedtime  clopidogrel Tablet 75 milliGRAM(s) Oral daily  lisinopril 5 milliGRAM(s) Oral daily  metoprolol tartrate 25 milliGRAM(s) Oral two times a day  sodium chloride 0.9%. 1000 milliLiter(s) (100 mL/Hr) IV Continuous <Continuous>    MEDICATIONS  (PRN):  acetaminophen   Tablet .. 650 milliGRAM(s) Oral every 6 hours PRN Temp greater or equal to 38C (100.4F), Moderate Pain (4 - 6)  aluminum hydroxide/magnesium hydroxide/simethicone Suspension 30 milliLiter(s) Oral every 6 hours PRN Dyspepsia      ROS:     Detailed ten system ROS was performed and was negative except for history as eluded to above.    no fever  no chills  no nausea  no vomiting  no diarrhea  no constipation  no melena  no hematochezia  no chest pain  no palpitations  no sob at rest  no dyspnea on exertion  no cough  no wheezing  no anorexia  no headache  no dizziness  no syncope  no weakness  no myalgia  no dysuria  no polyuria  no hematuria       Vital Signs Last 24 Hrs  T(C): 36.3 (17 May 2019 00:39), Max: 36.7 (16 May 2019 15:22)  T(F): 97.3 (17 May 2019 00:39), Max: 98.1 (16 May 2019 15:22)  HR: 66 (17 May 2019 03:00) (66 - 79)  BP: 116/65 (17 May 2019 03:00) (106/64 - 154/85)  BP(mean): 78 (17 May 2019 03:00) (72 - 85)  RR: 11 (17 May 2019 03:00) (9 - 18)  SpO2: 94% (17 May 2019 03:00) (94% - 100%)    I&O's Summary    16 May 2019 07:01  -  17 May 2019 05:12  --------------------------------------------------------  IN: 700 mL / OUT: 0 mL / NET: 700 mL        PHYSICAL EXAM:    General:                Comfortable, AAO X 3, in no distress.  HEENT:                  Atraumatic, PERRLA, EOMI, conjunctiva clear.    Neck:                     Supple, no adenopathy, no thyromegaly, no JVD, no bruit.  Back:                     Symmetric, non tender.  Chest:                    Clear, B/L symmetric air entry, no tachypnea  Heart:                     S1, S2 normal, no gallop, no murmur.  Abdomen:              Soft, non-tender, bowel sounds active. No palpable masses.  Extremities:           no cyanosis, no edema. Peripheral pulses normal. Right groin Ok  Skin:                      Skin color, texture normal. No rashes.  Neurologic:            Grossly nonfocal.       TELEMETRY:  Normal sinus rhythm with no tachy or claire events. Short NSVT last night.      ECG:  NSR, inferior infarct    LABS:                        13.8   7.37  )-----------( 176      ( 16 May 2019 15:23 )             39.4     05-16    140  |  107  |  18  ----------------------------<  100<H>  3.7   |  26  |  0.98    Ca    8.6      16 May 2019 15:23    TPro  6.8  /  Alb  4.0  /  TBili  0.7  /  DBili  x   /  AST  21  /  ALT  27  /  AlkPhos  54  05-16 05-17 @ 00:21  Trop-I 32.400    05-16 @ 15:23  Trop-I <0.015      PT/INR - ( 16 May 2019 15:23 )   PT: 11.6 sec;   INR: 1.04 ratio    PTT - ( 16 May 2019 15:23 )  PTT:25.2 sec

## 2019-05-17 NOTE — PROGRESS NOTE ADULT - ASSESSMENT
Acute inferior infarct  CAD, status post TYSON of mid and distal RCA.  Nonobstructive disease (50%) mid LAD, D1  Hypertension    Suggest:    Continue current treatment  Dual antiplatelet therapy  Beta blockers  ACE inhibitors  Statins  Out of bed to chair, ambulate  Echocardiogram today to evaluate LV function and wall motion  Follow-up labs  Possible discharge home tomorrow if remains stable.

## 2019-05-17 NOTE — PROGRESS NOTE ADULT - SUBJECTIVE AND OBJECTIVE BOX
NP Progress Note     Follow Up: IWMI S/P PCI      HPI:  74M w/PMH HTN, daily ETOH intake,  presented w/ substernal chest pain, radiating to his neck, associated w/ nausea and diaphoresis, pressure in quality, no prior similar episode. Pt plays gold regularly and he was playing this morning w/out issues. 20 minutes after he got home symptoms started.  Wife called EMS and was instructed to take asa 324mg.  He was BIBA and found w/ IWMI and taken to cath lab.  He is s/p PCI w/ TYSON x2 to mid and distal RCA.  He's currently in CCU and doing well, no c/o, wife is present.  Case d/w cath NP.      PAST MEDICAL & SURGICAL HISTORY:  HTN (hypertension)  s/p BCC of face and arm removed  s/p back surgery   s/p Cataract  S/P gum surgery 5/15/19      Allergies: No Known Allergies    Social History:   denies smoking, drug use  daily 4-5 glasses of wine    FAMILY HISTORY:  denies any FMx of CAD             Subjective/Observations: Pt. seen and examined and evaluated. Pt. resting comfortably in bed in NAD, with no respiratory distress, no chest pain, dyspnea, palpitations, PND, or orthopnea.      REVIEW OF SYSTEMS: All other review of systems is negative unless indicated above    PAST MEDICAL & SURGICAL HISTORY:  HTN (hypertension)      MEDICATIONS  (STANDING):  aspirin enteric coated 325 milliGRAM(s) Oral daily  atorvastatin 80 milliGRAM(s) Oral at bedtime  clopidogrel Tablet 75 milliGRAM(s) Oral daily  lisinopril 5 milliGRAM(s) Oral daily  metoprolol tartrate 25 milliGRAM(s) Oral two times a day  sodium chloride 0.9%. 1000 milliLiter(s) (100 mL/Hr) IV Continuous <Continuous>    MEDICATIONS  (PRN):  acetaminophen   Tablet .. 650 milliGRAM(s) Oral every 6 hours PRN Temp greater or equal to 38C (100.4F), Moderate Pain (4 - 6)  aluminum hydroxide/magnesium hydroxide/simethicone Suspension 30 milliLiter(s) Oral every 6 hours PRN Dyspepsia      Allergies: No Known Allergies          Vital Signs Last 24 Hrs  T(C): 36.1 (17 May 2019 06:42), Max: 36.7 (16 May 2019 15:22)  T(F): 97 (17 May 2019 06:42), Max: 98.1 (16 May 2019 15:22)  HR: 70 (17 May 2019 06:00) (63 - 79)  BP: 114/92 (17 May 2019 06:00) (103/57 - 154/85)  BP(mean): 97 (17 May 2019 06:00) (69 - 97)  RR: 22 (17 May 2019 06:00) (9 - 22)  SpO2: 97% (17 May 2019 06:00) (94% - 100%)    I&O's Summary    16 May 2019 07:01  -  17 May 2019 07:00  --------------------------------------------------------  IN: 700 mL / OUT: 0 mL / NET: 700 mL      Weight (kg): 94.2 (05-16 @ 15:22)        LABS: All Labs Reviewed:                      Echo:    Stress Testing:     Cath:  < from: Cardiac Cath Lab - Adult (05.16.19 @ 16:09) >  [facility]  Ventriculography Findings:  Normal left ventricular systolic function.  Left ventricular cavity size is normal.   Diagnostic Conclusions   One Vessel coronary artery disease (RCA) .   Normal left ventricular systolic function with segmental wall motion   abnormalities. Estimated LV ejection fraction is 60 %.   No aortic valve stenosis.   Interventional Conclusions   Successful Coronary Intervention TYSON of mid, distal RCA.           EKG:    Interpretation of Telemetry:      Physical Exam:  Appearance: [ ] Normal  [ ] abnormal [ ] NAD   Eyes: [ ] PERRL [ ] EOMI  HEENT: [ ] Normal [ ] Abnormal oral mucosa [ ]NC/AT  Cardiovascular: [ ] S1 [ ] S2 [ ] RRR [ ] m/r/g [ ]edema [ ] JVP  Procedural Access Site: [ ]  hematoma [ ] tender to palpation [ ] 2+ pulse [ ] bruit [ ] Ecchymosis  Respiratory: [ ] Clear to auscultation bilaterally  Gastrointestinal: [ ] Soft [ ] tenderness[ ] distension [ ] BS  Musculoskeletal: [ ] clubbing [ ] joint deformity   Neurologic: [ ] Non-focal  Lymphatic: [ ] lymphadenopathy  Psychiatry: [ ] AAOx3  [ ] confused [ ] disoriented [ ] Mood & affect appropriate  Skin: [ ]  rashes [ ] ecchymoses [ ] cyanosis NP Progress Note     Follow Up: IWMI S/P PCI      HPI:  74M w/PMH HTN, daily ETOH intake,  presented w/ substernal chest pain, radiating to his neck, associated w/ nausea and diaphoresis, pressure in quality, no prior similar episode. Pt plays golf regularly and he was playing this morning w/out issues. 20 minutes after he got home symptoms started.  Wife called EMS and was instructed to take asa 324mg.  He was BIBA and found w/ IWMI and taken to cath lab.  He is s/p PCI w/ TYSON x2 to mid and distal RCA.  He's currently in CCU and doing well, no c/o, wife is present. S/P C       PAST MEDICAL & SURGICAL HISTORY:  HTN (hypertension)  s/p BCC of face and arm removed  s/p back surgery   s/p Cataract  S/P gum surgery 5/15/19      Allergies: No Known Allergies    Social History:   denies smoking, drug use  daily 4-5 glasses of wine    FAMILY HISTORY:  denies any FMx of CAD             Subjective/Observations: Pt. seen and examined and evaluated. Pt. resting comfortably in bed in NAD, with no respiratory distress, no chest pain, dyspnea, palpitations, PND, or orthopnea.      REVIEW OF SYSTEMS: All other review of systems is negative unless indicated above    PAST MEDICAL & SURGICAL HISTORY:  HTN (hypertension)      MEDICATIONS  (STANDING):  aspirin enteric coated 325 milliGRAM(s) Oral daily  atorvastatin 80 milliGRAM(s) Oral at bedtime  clopidogrel Tablet 75 milliGRAM(s) Oral daily  lisinopril 5 milliGRAM(s) Oral daily  metoprolol tartrate 25 milliGRAM(s) Oral two times a day  sodium chloride 0.9%. 1000 milliLiter(s) (100 mL/Hr) IV Continuous <Continuous>    MEDICATIONS  (PRN):  acetaminophen   Tablet .. 650 milliGRAM(s) Oral every 6 hours PRN Temp greater or equal to 38C (100.4F), Moderate Pain (4 - 6)  aluminum hydroxide/magnesium hydroxide/simethicone Suspension 30 milliLiter(s) Oral every 6 hours PRN Dyspepsia      Allergies: No Known Allergies          Vital Signs Last 24 Hrs  T(C): 36.1 (17 May 2019 06:42), Max: 36.7 (16 May 2019 15:22)  T(F): 97 (17 May 2019 06:42), Max: 98.1 (16 May 2019 15:22)  HR: 70 (17 May 2019 06:00) (63 - 79)  BP: 114/92 (17 May 2019 06:00) (103/57 - 154/85)  BP(mean): 97 (17 May 2019 06:00) (69 - 97)  RR: 22 (17 May 2019 06:00) (9 - 22)  SpO2: 97% (17 May 2019 06:00) (94% - 100%)    I&O's Summary    16 May 2019 07:01  -  17 May 2019 07:00  --------------------------------------------------------  IN: 700 mL / OUT: 0 mL / NET: 700 mL      Weight (kg): 94.2 (05-16 @ 15:22)        LABS: All Labs Reviewed:                         14.7   9.26  )-----------( 191      ( 17 May 2019 07:42 )             42.2   05-17    138  |  107  |  20  ----------------------------<  101<H>  3.9   |  25  |  1.03    Ca    8.6      17 May 2019 07:42    TPro  6.8  /  Alb  4.0  /  TBili  0.7  /  DBili  x   /  AST  21  /  ALT  27  /  AlkPhos  54  05-16                Echo:    Stress Testing:     Cath:  < from: Cardiac Cath Lab - Adult (05.16.19 @ 16:09) >  [facility]  Ventriculography Findings:  Normal left ventricular systolic function.  Left ventricular cavity size is normal.   Diagnostic Conclusions   One Vessel coronary artery disease (RCA) .   Normal left ventricular systolic function with segmental wall motion   abnormalities. Estimated LV ejection fraction is 60 %.   No aortic valve stenosis.   Interventional Conclusions   Successful Coronary Intervention TYSON of mid, distal RCA.           EKG: SR @ 58BPM    Interpretation of Telemetry: Overnight on telemetry NSR @ 59-67 BPM runs of PVC 5-10 beats      Physical Exam:  Appearance: [ ] Normal  [ ] abnormal [X ] NAD   Eyes: [ ] PERRL [ ] EOMI  HEENT: [ ] Normal [ ] Abnormal oral mucosa [ ]NC/AT  Cardiovascular: [X ] S1 [X ] S2 [ ] RRR [ ] m/r/g [ ]edema [ ] JVP  Procedural Access Site: [X]  rt groin benign soft no bleeding no hematoma +1PP  Respiratory: [X ] Clear to auscultation bilaterally  Gastrointestinal: [ ] Soft [ ] tenderness[ ] distension [ ] BS  Musculoskeletal: [ ] clubbing [ ] joint deformity   Neurologic: [ ] Non-focal  Lymphatic: [ ] lymphadenopathy  Psychiatry: [X ] AAOx3  [ ] confused [ ] disoriented [ ] Mood & affect appropriate  Skin: [ ]  rashes [ ] ecchymoses [ ] cyanosis

## 2019-05-18 ENCOUNTER — TRANSCRIPTION ENCOUNTER (OUTPATIENT)
Age: 74
End: 2019-05-18

## 2019-05-18 VITALS — HEART RATE: 55 BPM | RESPIRATION RATE: 17 BRPM

## 2019-05-18 RX ORDER — ATORVASTATIN CALCIUM 80 MG/1
1 TABLET, FILM COATED ORAL
Qty: 30 | Refills: 0
Start: 2019-05-18 | End: 2019-06-16

## 2019-05-18 RX ORDER — CLOPIDOGREL BISULFATE 75 MG/1
1 TABLET, FILM COATED ORAL
Qty: 30 | Refills: 0
Start: 2019-05-18 | End: 2019-06-16

## 2019-05-18 RX ORDER — LISINOPRIL 2.5 MG/1
1 TABLET ORAL
Qty: 30 | Refills: 0
Start: 2019-05-18 | End: 2019-06-16

## 2019-05-18 RX ORDER — ASPIRIN/CALCIUM CARB/MAGNESIUM 324 MG
1 TABLET ORAL
Qty: 0 | Refills: 0 | DISCHARGE
Start: 2019-05-18

## 2019-05-18 RX ORDER — LOVASTATIN 20 MG
1 TABLET ORAL
Qty: 14 | Refills: 0
Start: 2019-05-18 | End: 2019-05-31

## 2019-05-18 RX ORDER — METOPROLOL TARTRATE 50 MG
1 TABLET ORAL
Qty: 60 | Refills: 0
Start: 2019-05-18 | End: 2019-06-16

## 2019-05-18 RX ADMIN — Medication 25 MILLIGRAM(S): at 06:34

## 2019-05-18 RX ADMIN — LISINOPRIL 5 MILLIGRAM(S): 2.5 TABLET ORAL at 06:34

## 2019-05-18 NOTE — DISCHARGE NOTE NURSING/CASE MANAGEMENT/SOCIAL WORK - NSDCPEWEB_GEN_ALL_CORE
[facility] for Tobacco Control website --- [email or web address] website --- [email or web address]

## 2019-05-18 NOTE — DISCHARGE NOTE NURSING/CASE MANAGEMENT/SOCIAL WORK - NSDCDPATPORTLINK_GEN_ALL_CORE
You can access the [facility] Portal, offered by [facility], by registering with the following website: [email or web address]

## 2019-05-18 NOTE — PROGRESS NOTE ADULT - SUBJECTIVE AND OBJECTIVE BOX
HPI   Patient is  73-year-old he was admitted with chest pain and he was diagnosed to have acute inferior wall MI, he status post emergent cardiac catheterization and drug-eluting stent placement to mid ,& distal RCA ,and PTCA of RPDA. He also has 50% mid LAD and diagonal 1 .. Since  admission he denies any chest pain, shortness of breath, palpitation. He is comfortable and is ambulating without any discomfort. He is in normal sinus rhythm on telemetry and is hemodynamically stable.          Cardiac Cath: 5/16/19 TYSON of mid and distal RCA, PTCA of ostial RPDA. 50% mid LAD and ostial D1     Transthoracic Echocardiogram (05.17.19   Summary     Mild to moderately reduced left ventricular systolic function; inferior   wall hypkinesis.   Estimated left ventricular ejection fraction is 45-50 %.          PAST MEDICAL   HTN (hypertension)            LABS:                        13.8   7.37  )-----------( 176      ( 16 May 2019 15:23 )             39.4     05-16    140  |  107  |  18  ----------------------------<  100<H>  3.7   |  26  |  0.98    Ca    8.6      16 May 2019 15:23    TPro  6.8  /  Alb  4.0  /  TBili  0.7  /  DBili  x   /  AST  21  /  ALT  27  /  AlkPhos  54  05-16    PT/INR - ( 16 May 2019 15:23 )   PT: 11.6 sec;   INR: 1.04 ratio         PTT - ( 16 May 2019 15:23 )  PTT:25.2 sec  CARDIAC MARKERS ( 16 May 2019 15:23 )  <0.015 ng/mL / x     / x     / x     / x                PAST MEDICAL & SURGICAL HISTORY:  HTN (hypertension)          MEDICATIONS  (STANDING):  aspirin enteric coated 325 milliGRAM(s) Oral daily  atorvastatin 80 milliGRAM(s) Oral at bedtime  clopidogrel Tablet 75 milliGRAM(s) Oral daily  lisinopril 5 milliGRAM(s) Oral daily  metoprolol tartrate 25 milliGRAM(s) Oral two times a day  sodium chloride 0.9%. 1000 milliLiter(s) (100 mL/Hr) IV Continuous <Continuous>    MEDICATIONS  (PRN):  acetaminophen   Tablet .. 650 milliGRAM(s) Oral every 6 hours PRN Temp greater or equal to 38C (100.4F), Moderate Pain (4 - 6)  aluminum hydroxide/magnesium hydroxide/simethicone Suspension 30 milliLiter(s) Oral every 6 hours PRN Dyspepsia          REVIEW OF SYSTEM:  Pertinent items are noted in HPI.  Constitutional	Negative for chills, fevers, sweats.    Eyes: 	Negative for visual disturbance.  Ears, nose, mouth, throat, and face: Negative for epistaxis, nasal congestion, sore throat and tinnitus.  Neck:	Negative for enlargement, pain and difficulty in swallowing  Respiration : Negative for cough, dyspnea on exertion, pleuritic chest pain and wheezing  Cardiovascular: Negative for chest pain, dyspnea and palpitations    Gastrointestinal : Negative for abdominal pain, diarrhea, nausea and vomiting  Genitourinary: Negative for dysuria, frequency and urinary incontinence .  Skin: Negative for  rash, pruritus, swelling, dryness .  	  Hematologic/lymphatic: Negative for bleeding and easy bruising  Musculoskeletal: Negative for arthralgias, back pain and muscle weakness.  Neurological: Negative for dizziness, headaches, seizures and tremors  Behavioral/Psych: Negative for mood change, depression.  Endocrine:	Negative for blurry vision, polydipsia and polyuria, diaphoresis.   Allergic/Immunologic:	Negative for anaphylaxis, angioedema and urticaria.      Vital Signs Last 24 Hrs  T(C): 37.2 (18 May 2019 06:02), Max: 37.2 (18 May 2019 06:02)  T(F): 98.9 (18 May 2019 06:02), Max: 98.9 (18 May 2019 06:02)  HR: 63 (18 May 2019 06:00) (58 - 71)  BP: 131/85 (18 May 2019 06:00) (112/55 - 131/85)  BP(mean): 93 (18 May 2019 06:00) (68 - 93)  RR: 16 (18 May 2019 06:00) (11 - 19)  SpO2: 97% (18 May 2019 06:00) (95% - 99%)    I&O's Summary    17 May 2019 07:01  -  18 May 2019 07:00  --------------------------------------------------------  IN: 1200 mL / OUT: 0 mL / NET: 1200 mL      PHYSICAL EXAM  General Appearance: cooperative, no acute distress,   HEENT: PERRL, conjunctiva clear, EOM's intact, .  Neck: Supple, , no adenopathy, thyroid: not enlarged, no carotid bruit or JVD  Back: Symmetric, no  tenderness,no soft tissue tenderness  Lungs: Clear to auscultation bilateral,no adventitious breath sounds, normal   expiratory phase  Heart: Regular rate and rhythm, S1, S2 normal, no murmur, rub or gallop  Abdomen: Soft, non-tender, bowel sounds active , no hepatosplenomegaly  Extremities: no cyanosis or edema, no joint swelling  Skin: Skin color, texture normal, no rashes   Neurologic: Alert and oriented X3 , cranial nerves intact, sensory and motor normal,        INTERPRETATION OF TELEMETRY: NSR no changes.    ECG: NSR PVC IWMI        LABS:                          14.7   9.26  )-----------( 191      ( 17 May 2019 07:42 )             42.2     05-17    138  |  107  |  20  ----------------------------<  101<H>  3.9   |  25  |  1.03    Ca    8.6      17 May 2019 07:42  Mg     2.5     05-17    TPro  6.8  /  Alb  4.0  /  TBili  0.7  /  DBili  x   /  AST  21  /  ALT  27  /  AlkPhos  54  05-16    CARDIAC MARKERS ( 17 May 2019 15:48 )  20.800 ng/mL / x     / x     / x     / x      CARDIAC MARKERS ( 17 May 2019 07:42 )  32.900 ng/mL / x     / x     / x     / x      CARDIAC MARKERS ( 17 May 2019 00:21 )  32.400 ng/mL / x     / x     / x     / x      CARDIAC MARKERS ( 16 May 2019 15:23 )  <0.015 ng/mL / x     / x     / x     / x          Lipid Panel  259  66  173  100      PT/INR - ( 16 May 2019 15:23 )   PT: 11.6 sec;   INR: 1.04 ratio         PTT - ( 16 May 2019 15:23 )  PTT:25.2 sec

## 2019-05-18 NOTE — PROGRESS NOTE ADULT - ASSESSMENT
Acute inferior infarct he has been stable since his stent. He is ambulating without any discomfort.  CAD, status post TYSON of mid and distal RCA.  Nonobstructive disease (50%) mid LAD, D1  Hypertension    Suggest:    Continue current treatment  Dual antiplatelet therapy  Beta blockers  ACE inhibitors  Statins  patient was educated on medications diet and activity.  Discussed with patients wife at bedside.  Discussed with hospitalist.

## 2019-05-18 NOTE — CHART NOTE - NSCHARTNOTEFT_GEN_A_CORE
Clinical Nutrition Education Note    *received consult for education.  chart reviewed.  Pt with good appetite/PO intake, meeting estimated nutr needs.  intentional wt loss via weight watchers.  Provided pt with heart healthy nutrition therapy and heart healthy myplate graphic.  Detailed heart healthy diet education was discussed with both patient and wife, both verbalized understanding.

## 2019-05-18 NOTE — DISCHARGE NOTE NURSING/CASE MANAGEMENT/SOCIAL WORK - NSDCPEEMAIL_GEN_ALL_CORE
[facility] for Tobacco Control email [email or web address]

## 2019-05-18 NOTE — DISCHARGE NOTE PROVIDER - NSDCCPCAREPLAN_GEN_ALL_CORE_FT
PRINCIPAL DISCHARGE DIAGNOSIS  Diagnosis: ST elevation myocardial infarction (STEMI), unspecified artery  Assessment and Plan of Treatment: stents to right coronary artery  - Take meds as prescribed.   - follow up with Cardiology in 1 week.   - Repeat echo in a few months as per Cardiology. PRINCIPAL DISCHARGE DIAGNOSIS  Diagnosis: ST elevation myocardial infarction (STEMI), unspecified artery  Assessment and Plan of Treatment: stents to right coronary artery  - Take meds as prescribed.   - follow up with Cardiology in 1 week- discuss statin dose.   - Repeat echo in a few months as per Cardiology.

## 2019-05-18 NOTE — DISCHARGE NOTE PROVIDER - HOSPITAL COURSE
1. acute  STEMI s/p PCI, TYSON x 2 to RCA    - aspirin, plavix, statin, BB, ACEi    - Echo EF 45-50%, mild to mod reduced LV systolic function, will need repeat echo outpatient 3 months     - Cardio f/u appreciated          2. htn    - controlled on meds          dvt px    - ambulating         Stable for d/c.  Time 35minutes

## 2019-05-18 NOTE — PROGRESS NOTE ADULT - SUBJECTIVE AND OBJECTIVE BOX
cc: cp  HPI: 74y male w/ pmh htn, etoh use p/w chest pain.  Now s/p PCI w/ TYSON x 2 to mid and distal RCA.   No complaints this morning.  No cp, sob, palp, no abd pain or groin pain. Ambulating, eating.     5/18- no overnight events, no complaints. stable for d/c home.     ros- as per hpi above, 10 point ros neg      Vital Signs Last 24 Hrs  T(C): 37.2 (18 May 2019 06:02), Max: 37.2 (18 May 2019 06:02)  T(F): 98.9 (18 May 2019 06:02), Max: 98.9 (18 May 2019 06:02)  HR: 63 (18 May 2019 06:00) (58 - 71)  BP: 131/85 (18 May 2019 06:00) (112/55 - 131/85)  BP(mean): 93 (18 May 2019 06:00) (68 - 93)  RR: 16 (18 May 2019 06:00) (11 - 19)  SpO2: 97% (18 May 2019 06:00) (95% - 99%)  T(C): 37.1 (17 May 2019 08:28), Max: 37.1 (17 May 2019 08:28)  T(F): 98.7 (17 May 2019 08:28), Max: 98.7 (17 May 2019 08:28)    PHYSICAL EXAM:  General: NAD, comfortable  Neuro: AAOx3  HEENT: NCAT,   Neck: supple   Respiratory: CTA b/l  Cardiovascular: S1 and S2, RRR  Gastrointestinal: soft, non ttp   Extremities: No edema; no groin ttp   Vascular: 2+ peripheral pulses  Musculoskeletal: full rom         LABS: All Labs Reviewed:                        14.7   9.26  )-----------( 191      ( 17 May 2019 07:42 )             42.2     05-17    138  |  107  |  20  ----------------------------<  101<H>  3.9   |  25  |  1.03    Ca    8.6      17 May 2019 07:42  Mg     2.5     05-17    TPro  6.8  /  Alb  4.0  /  TBili  0.7  /  DBili  x   /  AST  21  /  ALT  27  /  AlkPhos  54  05-16    PT/INR - ( 16 May 2019 15:23 )   PT: 11.6 sec;   INR: 1.04 ratio         PTT - ( 16 May 2019 15:23 )  PTT:25.2 sec  CARDIAC MARKERS ( 17 May 2019 07:42 )  32.900 ng/mL / x     / x     / x     / x      CARDIAC MARKERS ( 17 May 2019 00:21 )  32.400 ng/mL / x     / x     / x     / x      CARDIAC MARKERS ( 16 May 2019 15:23 )  <0.015 ng/mL / x     / x     / x     / x          MEDICATIONS  (STANDING):  aspirin enteric coated 325 milliGRAM(s) Oral daily  atorvastatin 80 milliGRAM(s) Oral at bedtime  clopidogrel Tablet 75 milliGRAM(s) Oral daily  lisinopril 5 milliGRAM(s) Oral daily  metoprolol tartrate 25 milliGRAM(s) Oral two times a day  sodium chloride 0.9%. 1000 milliLiter(s) (100 mL/Hr) IV Continuous <Continuous>    MEDICATIONS  (PRN):  acetaminophen   Tablet .. 650 milliGRAM(s) Oral every 6 hours PRN Temp greater or equal to 38C (100.4F), Moderate Pain (4 - 6)  aluminum hydroxide/magnesium hydroxide/simethicone Suspension 30 milliLiter(s) Oral every 6 hours PRN Dyspepsia        ASSESSMENT and PLAN:    1. acute  STEMI s/p PCI, TYSON x 2 to RCA  - aspirin, plavix, statin, BB, ACEi  - Echo EF 45-50%, mild to mod reduced LV systolic function, will need repeat echo outpatient 3 months   - Cardio f/u appreciated     2. htn  - controlled on meds      dvt px  - ambulating     Stable for d/c.

## 2019-05-18 NOTE — DISCHARGE NOTE PROVIDER - CARE PROVIDER_API CALL
Adán Dennis)  Cardiology; Interventional Cardiology  [address], [place] [address]  Phone: [phone]  Fax: [phone]  Follow Up Time:

## 2019-05-21 PROBLEM — I10 ESSENTIAL (PRIMARY) HYPERTENSION: Chronic | Status: ACTIVE | Noted: 2019-05-16

## 2019-05-24 DIAGNOSIS — I25.84 CORONARY ATHEROSCLEROSIS DUE TO CALCIFIED CORONARY LESION: ICD-10-CM

## 2019-05-24 DIAGNOSIS — Z85.828 PERSONAL HISTORY OF OTHER MALIGNANT NEOPLASM OF SKIN: ICD-10-CM

## 2019-05-24 DIAGNOSIS — Z87.891 PERSONAL HISTORY OF NICOTINE DEPENDENCE: ICD-10-CM

## 2019-05-24 DIAGNOSIS — I25.10 ATHEROSCLEROTIC HEART DISEASE OF NATIVE CORONARY ARTERY WITHOUT ANGINA PECTORIS: ICD-10-CM

## 2019-05-24 DIAGNOSIS — I21.11 ST ELEVATION (STEMI) MYOCARDIAL INFARCTION INVOLVING RIGHT CORONARY ARTERY: ICD-10-CM

## 2019-05-24 DIAGNOSIS — I10 ESSENTIAL (PRIMARY) HYPERTENSION: ICD-10-CM

## 2019-06-07 ENCOUNTER — RX RENEWAL (OUTPATIENT)
Age: 74
End: 2019-06-07

## 2019-06-12 ENCOUNTER — APPOINTMENT (OUTPATIENT)
Dept: FAMILY MEDICINE | Facility: CLINIC | Age: 74
End: 2019-06-12
Payer: MEDICARE

## 2019-06-12 VITALS
SYSTOLIC BLOOD PRESSURE: 102 MMHG | HEART RATE: 59 BPM | WEIGHT: 203.13 LBS | DIASTOLIC BLOOD PRESSURE: 68 MMHG | HEIGHT: 71 IN | BODY MASS INDEX: 28.44 KG/M2 | OXYGEN SATURATION: 99 %

## 2019-06-12 LAB
BILIRUB UR QL STRIP: NEGATIVE
CLARITY UR: CLEAR
COLLECTION METHOD: NORMAL
GLUCOSE UR-MCNC: NEGATIVE
HCG UR QL: 0.2 EU/DL
HGB UR QL STRIP.AUTO: NEGATIVE
KETONES UR-MCNC: NEGATIVE
LEUKOCYTE ESTERASE UR QL STRIP: NEGATIVE
NITRITE UR QL STRIP: NEGATIVE
PH UR STRIP: 7
PROT UR STRIP-MCNC: NEGATIVE
SP GR UR STRIP: 1.01

## 2019-06-12 PROCEDURE — 99495 TRANSJ CARE MGMT MOD F2F 14D: CPT | Mod: 25

## 2019-06-12 PROCEDURE — G0444 DEPRESSION SCREEN ANNUAL: CPT | Mod: 59

## 2019-06-12 PROCEDURE — 81003 URINALYSIS AUTO W/O SCOPE: CPT | Mod: QW

## 2019-06-12 RX ORDER — APIXABAN 5 MG/1
5 TABLET, FILM COATED ORAL
Refills: 0 | Status: ACTIVE | COMMUNITY

## 2019-06-12 NOTE — ASSESSMENT
[FreeTextEntry1] : Status post stenting and atrial fibrillation with controlled ventricular response continue current medications\par \par Microscopic hematuria patient states he never gave a urine sample at [facility] and urinalysis today is negative we will obtain 2 more urinalyses if remain negative then further work-up unnecessary

## 2019-06-12 NOTE — HISTORY OF PRESENT ILLNESS
[de-identified] : Hospitalized last month with angina patient had 2 stents right circumflex artery slight elevation of troponin.  Also noted to be in atrial fibrillation which has been persistent currently on Eliquis.  Followed at [facility]  urinalysis at [place] labs showed microscopic hematuria no history of renal issues

## 2019-06-19 LAB
APPEARANCE: CLEAR
BILIRUBIN URINE: NEGATIVE
BLOOD URINE: NEGATIVE
COLOR: COLORLESS
GLUCOSE QUALITATIVE U: NEGATIVE
KETONES URINE: NEGATIVE
LEUKOCYTE ESTERASE URINE: NEGATIVE
NITRITE URINE: NEGATIVE
PH URINE: 5
PROTEIN URINE: NEGATIVE
SPECIFIC GRAVITY URINE: 1.01
UROBILINOGEN URINE: NORMAL

## 2019-06-25 LAB
APPEARANCE: CLEAR
BILIRUBIN URINE: NEGATIVE
BLOOD URINE: NEGATIVE
COLOR: NORMAL
GLUCOSE QUALITATIVE U: NEGATIVE
KETONES URINE: NEGATIVE
LEUKOCYTE ESTERASE URINE: NEGATIVE
NITRITE URINE: NEGATIVE
PH URINE: 5.5
PROTEIN URINE: NEGATIVE
SPECIFIC GRAVITY URINE: 1.01
UROBILINOGEN URINE: NORMAL

## 2019-08-19 ENCOUNTER — APPOINTMENT (OUTPATIENT)
Dept: FAMILY MEDICINE | Facility: CLINIC | Age: 74
End: 2019-08-19
Payer: MEDICARE

## 2019-08-19 VITALS
OXYGEN SATURATION: 98 % | WEIGHT: 194 LBS | HEART RATE: 60 BPM | HEIGHT: 71 IN | DIASTOLIC BLOOD PRESSURE: 68 MMHG | BODY MASS INDEX: 27.16 KG/M2 | SYSTOLIC BLOOD PRESSURE: 114 MMHG | TEMPERATURE: 97.7 F

## 2019-08-19 PROCEDURE — 99213 OFFICE O/P EST LOW 20 MIN: CPT

## 2019-08-19 NOTE — HISTORY OF PRESENT ILLNESS
[FreeTextEntry8] : has had persistent cough for 4 months after having 2 stents placed and started on lisinopril not productive no fever had neg chest xray a month ago at work

## 2019-09-19 ENCOUNTER — APPOINTMENT (OUTPATIENT)
Dept: FAMILY MEDICINE | Facility: CLINIC | Age: 74
End: 2019-09-19
Payer: MEDICARE

## 2019-09-19 VITALS
WEIGHT: 199.38 LBS | SYSTOLIC BLOOD PRESSURE: 120 MMHG | HEIGHT: 71 IN | BODY MASS INDEX: 27.91 KG/M2 | DIASTOLIC BLOOD PRESSURE: 62 MMHG | HEART RATE: 58 BPM | OXYGEN SATURATION: 97 %

## 2019-09-19 DIAGNOSIS — Z87.09 PERSONAL HISTORY OF OTHER DISEASES OF THE RESPIRATORY SYSTEM: ICD-10-CM

## 2019-09-19 DIAGNOSIS — I10 ESSENTIAL (PRIMARY) HYPERTENSION: ICD-10-CM

## 2019-09-19 DIAGNOSIS — R15.9 FULL INCONTINENCE OF FECES: ICD-10-CM

## 2019-09-19 DIAGNOSIS — R31.29 OTHER MICROSCOPIC HEMATURIA: ICD-10-CM

## 2019-09-19 PROCEDURE — 90653 IIV ADJUVANT VACCINE IM: CPT

## 2019-09-19 PROCEDURE — G0439: CPT

## 2019-09-19 PROCEDURE — G0008: CPT

## 2019-09-19 RX ORDER — LISINOPRIL 5 MG/1
5 TABLET ORAL
Refills: 0 | Status: DISCONTINUED | COMMUNITY
End: 2019-09-19

## 2019-09-19 RX ORDER — PRAVASTATIN SODIUM 40 MG/1
40 TABLET ORAL
Refills: 0 | Status: DISCONTINUED | COMMUNITY
End: 2019-09-19

## 2019-09-19 NOTE — HEALTH RISK ASSESSMENT
[Good] : ~his/her~ current health as good [] : No [Yes] : Yes [No falls in past year] : Patient reported no falls in the past year [Patient reported colonoscopy was normal] : Patient reported colonoscopy was normal [Change in mental status noted] : No change in mental status noted [Fully functional (bathing, dressing, toileting, transferring, walking, feeding)] : Fully functional (bathing, dressing, toileting, transferring, walking, feeding) [Fully functional (using the telephone, shopping, preparing meals, housekeeping, doing laundry, using] : Fully functional and needs no help or supervision to perform IADLs (using the telephone, shopping, preparing meals, housekeeping, doing laundry, using transportation, managing medications and managing finances) [ColonoscopyDate] : 01/14

## 2019-09-19 NOTE — HISTORY OF PRESENT ILLNESS
[de-identified] : Yearly physical. Patient has had no cardiovascular symptoms status post coronary artery stenting.\par \par He had post stenting atrial fibrillation. Patient currently on Eliquis. Reminded to avoid NSAIDs.\par \par Occasional episodes of fecal incontinence and seems to be related to wine drinking. Has appointment with GI

## 2019-09-19 NOTE — ASSESSMENT
[FreeTextEntry1] : Fecal incontinence discussed all her GI\par \par Status post drug-eluting stents. Follow up with cardiology\par \par Atrial fibrillation continue anticoagulation

## 2019-09-19 NOTE — PHYSICAL EXAM
[Penis Abnormality] : normal circumcised penis [Testes Mass (___cm)] : there were no testicular masses [Prostate Enlargement] : the prostate was not enlarged [Prostate Tenderness] : the prostate was not tender [No Prostate Nodules] : no prostate nodules [Normal] : no posterior cervical lymphadenopathy and no anterior cervical lymphadenopathy

## 2019-09-24 ENCOUNTER — APPOINTMENT (OUTPATIENT)
Dept: GASTROENTEROLOGY | Facility: CLINIC | Age: 74
End: 2019-09-24
Payer: MEDICARE

## 2019-09-24 VITALS
BODY MASS INDEX: 27.58 KG/M2 | DIASTOLIC BLOOD PRESSURE: 83 MMHG | HEART RATE: 57 BPM | WEIGHT: 197 LBS | SYSTOLIC BLOOD PRESSURE: 148 MMHG | HEIGHT: 71 IN

## 2019-09-24 LAB
T4 SERPL-MCNC: 5.1 UG/DL
TSH SERPL-ACNC: 1.83 UIU/ML

## 2019-09-24 PROCEDURE — 99214 OFFICE O/P EST MOD 30 MIN: CPT

## 2019-09-24 NOTE — ASSESSMENT
[FreeTextEntry1] : 75 yo male with history of diarrhea. Check thyroid tests and celiac titers. Trial of antispasmodics. Follow up in one month.

## 2019-09-24 NOTE — PHYSICAL EXAM
[General Appearance - Alert] : alert [General Appearance - In No Acute Distress] : in no acute distress [Auscultation Breath Sounds / Voice Sounds] : lungs were clear to auscultation bilaterally [Heart Rate And Rhythm] : heart rate was normal and rhythm regular [Heart Sounds] : normal S1 and S2 [Heart Sounds Gallop] : no gallops [Murmurs] : no murmurs [Heart Sounds Pericardial Friction Rub] : no pericardial rub [Abdomen Soft] : soft [Bowel Sounds] : normal bowel sounds [Abdomen Tenderness] : non-tender [] : no hepato-splenomegaly [Abdomen Mass (___ Cm)] : no abdominal mass palpated

## 2019-09-24 NOTE — HISTORY OF PRESENT ILLNESS
[de-identified] : Mr. AD ARCINIEGA is a 74 year old male with several year history of diarrhea. Patient has noted increasing symptoms with associated bloating and cramps. Colonoscopy in April was normal. Patient had recent change in cholesterol medication without any change in symptoms. Patient stopped red wine with some improvement. Imodium does give him some relief. There is no nocturnal symptoms or weight loss.\par

## 2019-09-26 LAB
TTG IGA SER IA-ACNC: <1.2 U/ML
TTG IGA SER-ACNC: NEGATIVE
TTG IGG SER IA-ACNC: 5 U/ML
TTG IGG SER IA-ACNC: NEGATIVE

## 2019-10-10 ENCOUNTER — APPOINTMENT (OUTPATIENT)
Dept: FAMILY MEDICINE | Facility: CLINIC | Age: 74
End: 2019-10-10

## 2019-11-01 ENCOUNTER — APPOINTMENT (OUTPATIENT)
Dept: GASTROENTEROLOGY | Facility: CLINIC | Age: 74
End: 2019-11-01
Payer: MEDICARE

## 2019-11-01 VITALS
WEIGHT: 196 LBS | HEART RATE: 72 BPM | SYSTOLIC BLOOD PRESSURE: 139 MMHG | HEIGHT: 71 IN | DIASTOLIC BLOOD PRESSURE: 78 MMHG | BODY MASS INDEX: 27.44 KG/M2

## 2019-11-01 DIAGNOSIS — R19.7 DIARRHEA, UNSPECIFIED: ICD-10-CM

## 2019-11-01 PROCEDURE — 99213 OFFICE O/P EST LOW 20 MIN: CPT

## 2019-11-01 NOTE — ASSESSMENT
[FreeTextEntry1] : 75 yo male with resolved diarrhea. Patient to call in one month - consider tapering medications at that time.

## 2020-01-16 ENCOUNTER — RX RENEWAL (OUTPATIENT)
Age: 75
End: 2020-01-16

## 2020-07-15 ENCOUNTER — APPOINTMENT (OUTPATIENT)
Dept: FAMILY MEDICINE | Facility: CLINIC | Age: 75
End: 2020-07-15
Payer: MEDICARE

## 2020-07-15 VITALS
WEIGHT: 196 LBS | HEIGHT: 71 IN | OXYGEN SATURATION: 98 % | DIASTOLIC BLOOD PRESSURE: 70 MMHG | HEART RATE: 60 BPM | TEMPERATURE: 97.6 F | BODY MASS INDEX: 27.44 KG/M2 | RESPIRATION RATE: 16 BRPM | SYSTOLIC BLOOD PRESSURE: 130 MMHG

## 2020-07-15 DIAGNOSIS — H10.12 ACUTE ATOPIC CONJUNCTIVITIS, LEFT EYE: ICD-10-CM

## 2020-07-15 PROCEDURE — 99213 OFFICE O/P EST LOW 20 MIN: CPT

## 2020-07-15 RX ORDER — PITAVASTATIN CALCIUM 2.09 MG/1
2 TABLET, FILM COATED ORAL
Refills: 0 | Status: DISCONTINUED | COMMUNITY
End: 2020-07-15

## 2020-07-15 RX ORDER — CLOPIDOGREL 75 MG/1
75 TABLET, FILM COATED ORAL
Refills: 0 | Status: DISCONTINUED | COMMUNITY
End: 2020-07-15

## 2020-07-15 RX ORDER — HYOSCYAMINE SULFATE 0.38 MG/1
0.38 TABLET, EXTENDED RELEASE ORAL
Qty: 60 | Refills: 3 | Status: DISCONTINUED | COMMUNITY
Start: 2019-09-24 | End: 2020-07-15

## 2020-07-15 RX ORDER — OLOPATADINE HYDROCHLORIDE 2 MG/ML
0.2 SOLUTION OPHTHALMIC
Qty: 1 | Refills: 3 | Status: ACTIVE | COMMUNITY
Start: 2020-07-15 | End: 1900-01-01

## 2020-07-15 NOTE — HISTORY OF PRESENT ILLNESS
[FreeTextEntry8] : 1 month of left greater than right itchy eyes with thin discharge along with rhinitis consistent with allergic conjunctivitis no visual changes\par \par Physical exam there is slight injection of bulbar conjunctive a cornea appears normal

## 2020-07-30 LAB
SARS-COV-2 IGG SERPL IA-ACNC: 0.08 INDEX
SARS-COV-2 IGG SERPL QL IA: NEGATIVE

## 2021-01-11 ENCOUNTER — TRANSCRIPTION ENCOUNTER (OUTPATIENT)
Age: 76
End: 2021-01-11

## 2021-05-20 ENCOUNTER — APPOINTMENT (OUTPATIENT)
Dept: FAMILY MEDICINE | Facility: CLINIC | Age: 76
End: 2021-05-20
Payer: MEDICARE

## 2021-05-20 ENCOUNTER — NON-APPOINTMENT (OUTPATIENT)
Age: 76
End: 2021-05-20

## 2021-05-20 VITALS
SYSTOLIC BLOOD PRESSURE: 134 MMHG | DIASTOLIC BLOOD PRESSURE: 88 MMHG | BODY MASS INDEX: 30.66 KG/M2 | HEART RATE: 55 BPM | HEIGHT: 71 IN | WEIGHT: 219 LBS | TEMPERATURE: 97 F | OXYGEN SATURATION: 98 %

## 2021-05-20 DIAGNOSIS — R42 DIZZINESS AND GIDDINESS: ICD-10-CM

## 2021-05-20 PROCEDURE — 99214 OFFICE O/P EST MOD 30 MIN: CPT | Mod: 25

## 2021-05-20 PROCEDURE — 93000 ELECTROCARDIOGRAM COMPLETE: CPT

## 2021-05-20 NOTE — HISTORY OF PRESENT ILLNESS
[FreeTextEntry8] : Brief episode of disequilibrium upon arising from bed 2 weeks ago resolved completely.  2 days ago disequilibrium recurred and has been persistent.  No vertigo no focal neurological symptoms no change in mental status slight nausea no vomiting.  Patient is on Eliquis for atrial fibrillation currently in sinus rhythm no loss of hearing no tinnitus\par \par Physical exam Romberg normal tandem gait normal finger-nose and dysdiadochokinesis normal deep tendon reflexes symmetrical no cortical drift visual fields intact tympanic membranes normal whisper test normal pulse is 60 and regular\par \par Persistent disequilibrium at increased risk for small brain bleed due to Eliquis therapy will check MRI meclizine as needed check labs

## 2021-05-21 LAB
ALBUMIN SERPL ELPH-MCNC: 4.6 G/DL
ALP BLD-CCNC: 64 U/L
ALT SERPL-CCNC: 18 U/L
ANION GAP SERPL CALC-SCNC: 11 MMOL/L
AST SERPL-CCNC: 21 U/L
BASOPHILS # BLD AUTO: 0.02 K/UL
BASOPHILS NFR BLD AUTO: 0.4 %
BILIRUB SERPL-MCNC: 0.4 MG/DL
BUN SERPL-MCNC: 13 MG/DL
CALCIUM SERPL-MCNC: 9.5 MG/DL
CHLORIDE SERPL-SCNC: 104 MMOL/L
CO2 SERPL-SCNC: 24 MMOL/L
CREAT SERPL-MCNC: 0.99 MG/DL
EOSINOPHIL # BLD AUTO: 0.07 K/UL
EOSINOPHIL NFR BLD AUTO: 1.3 %
ERYTHROCYTE [SEDIMENTATION RATE] IN BLOOD BY WESTERGREN METHOD: 3 MM/HR
GLUCOSE SERPL-MCNC: 108 MG/DL
HCT VFR BLD CALC: 45 %
HGB BLD-MCNC: 15.3 G/DL
IMM GRANULOCYTES NFR BLD AUTO: 0.6 %
LYMPHOCYTES # BLD AUTO: 1.55 K/UL
LYMPHOCYTES NFR BLD AUTO: 29.2 %
MAN DIFF?: NORMAL
MCHC RBC-ENTMCNC: 32.8 PG
MCHC RBC-ENTMCNC: 34 GM/DL
MCV RBC AUTO: 96.4 FL
MONOCYTES # BLD AUTO: 0.61 K/UL
MONOCYTES NFR BLD AUTO: 11.5 %
NEUTROPHILS # BLD AUTO: 3.03 K/UL
NEUTROPHILS NFR BLD AUTO: 57 %
PLATELET # BLD AUTO: 187 K/UL
POTASSIUM SERPL-SCNC: 5 MMOL/L
PROT SERPL-MCNC: 6.9 G/DL
RBC # BLD: 4.67 M/UL
RBC # FLD: 13.6 %
SODIUM SERPL-SCNC: 139 MMOL/L
WBC # FLD AUTO: 5.31 K/UL

## 2021-05-25 ENCOUNTER — OUTPATIENT (OUTPATIENT)
Dept: OUTPATIENT SERVICES | Facility: HOSPITAL | Age: 76
LOS: 1 days | End: 2021-05-25
Payer: MEDICARE

## 2021-05-25 ENCOUNTER — APPOINTMENT (OUTPATIENT)
Dept: MRI IMAGING | Facility: CLINIC | Age: 76
End: 2021-05-25
Payer: MEDICARE

## 2021-05-25 DIAGNOSIS — R42 DIZZINESS AND GIDDINESS: ICD-10-CM

## 2021-05-25 PROCEDURE — A9585: CPT

## 2021-05-25 PROCEDURE — 70553 MRI BRAIN STEM W/O & W/DYE: CPT

## 2021-05-25 PROCEDURE — G1004: CPT

## 2021-05-25 PROCEDURE — 70553 MRI BRAIN STEM W/O & W/DYE: CPT | Mod: 26,MF

## 2021-06-03 NOTE — REVIEW OF SYSTEMS
"Chief Complaint   Patient presents with     Work Comp     needle stick injury [id number]       Initial /62 (BP Location: Right arm, Patient Position: Chair, Cuff Size: Adult Regular)   Pulse 75   Temp 99.1  F (37.3  C) (Tympanic)   Ht 1.854 m (6' 1\")   Wt 86.2 kg (190 lb)   SpO2 98%   BMI 25.07 kg/m   Estimated body mass index is 25.07 kg/m  as calculated from the following:    Height as of this encounter: 1.854 m (6' 1\").    Weight as of this encounter: 86.2 kg (190 lb).  Medication Reconciliation: complete  Colleen Perez LPN    "
[Negative] : Heme/Lymph

## 2022-01-20 ENCOUNTER — APPOINTMENT (OUTPATIENT)
Dept: FAMILY MEDICINE | Facility: CLINIC | Age: 77
End: 2022-01-20
Payer: MEDICARE

## 2022-01-20 VITALS
HEART RATE: 66 BPM | TEMPERATURE: 97 F | DIASTOLIC BLOOD PRESSURE: 62 MMHG | SYSTOLIC BLOOD PRESSURE: 112 MMHG | HEIGHT: 71 IN | BODY MASS INDEX: 28.76 KG/M2 | OXYGEN SATURATION: 98 % | WEIGHT: 205.44 LBS

## 2022-01-20 DIAGNOSIS — R01.1 CARDIAC MURMUR, UNSPECIFIED: ICD-10-CM

## 2022-01-20 DIAGNOSIS — Z95.5 PRESENCE OF CORONARY ANGIOPLASTY IMPLANT AND GRAFT: ICD-10-CM

## 2022-01-20 DIAGNOSIS — K05.10 CHRONIC GINGIVITIS, PLAQUE INDUCED: ICD-10-CM

## 2022-01-20 PROCEDURE — 99214 OFFICE O/P EST MOD 30 MIN: CPT | Mod: 25

## 2022-01-20 PROCEDURE — G0444 DEPRESSION SCREEN ANNUAL: CPT | Mod: 59

## 2022-01-20 RX ORDER — CARBOXYMETHYLCELLULOSE SODIUM 5 MG/ML
0.5 SOLUTION/ DROPS OPHTHALMIC
Refills: 0 | Status: ACTIVE | COMMUNITY

## 2022-01-20 RX ORDER — MECLIZINE HYDROCHLORIDE 12.5 MG/1
12.5 TABLET ORAL
Qty: 30 | Refills: 1 | Status: DISCONTINUED | COMMUNITY
Start: 2021-05-20 | End: 2022-01-20

## 2022-01-20 RX ORDER — ASPIRIN ENTERIC COATED TABLETS 81 MG 81 MG/1
81 TABLET, DELAYED RELEASE ORAL
Refills: 0 | Status: ACTIVE | COMMUNITY

## 2022-01-20 NOTE — ASSESSMENT
[Patient Optimized for Surgery] : Patient optimized for surgery [FreeTextEntry4] : Patient will hold Eliquis 2 days prior to procedure\par \par Cardiology clearance pending

## 2022-10-05 ENCOUNTER — APPOINTMENT (OUTPATIENT)
Dept: FAMILY MEDICINE | Facility: CLINIC | Age: 77
End: 2022-10-05

## 2022-10-05 ENCOUNTER — NON-APPOINTMENT (OUTPATIENT)
Age: 77
End: 2022-10-05

## 2022-10-05 DIAGNOSIS — Z23 ENCOUNTER FOR IMMUNIZATION: ICD-10-CM

## 2022-10-05 PROCEDURE — G0008: CPT

## 2022-10-05 PROCEDURE — 90686 IIV4 VACC NO PRSV 0.5 ML IM: CPT

## 2022-11-23 ENCOUNTER — NON-APPOINTMENT (OUTPATIENT)
Age: 77
End: 2022-11-23

## 2022-11-24 ENCOUNTER — TRANSCRIPTION ENCOUNTER (OUTPATIENT)
Age: 77
End: 2022-11-24

## 2022-11-25 ENCOUNTER — APPOINTMENT (OUTPATIENT)
Dept: RADIOLOGY | Facility: CLINIC | Age: 77
End: 2022-11-25

## 2022-11-25 ENCOUNTER — OUTPATIENT (OUTPATIENT)
Dept: OUTPATIENT SERVICES | Facility: HOSPITAL | Age: 77
LOS: 1 days | End: 2022-11-25
Payer: MEDICARE

## 2022-11-25 DIAGNOSIS — R05.1 ACUTE COUGH: ICD-10-CM

## 2022-11-25 PROCEDURE — 71046 X-RAY EXAM CHEST 2 VIEWS: CPT

## 2022-11-25 PROCEDURE — 71046 X-RAY EXAM CHEST 2 VIEWS: CPT | Mod: 26

## 2022-11-30 ENCOUNTER — APPOINTMENT (OUTPATIENT)
Dept: FAMILY MEDICINE | Facility: CLINIC | Age: 77
End: 2022-11-30

## 2022-11-30 VITALS
HEIGHT: 71 IN | HEART RATE: 64 BPM | TEMPERATURE: 97.8 F | OXYGEN SATURATION: 97 % | SYSTOLIC BLOOD PRESSURE: 112 MMHG | DIASTOLIC BLOOD PRESSURE: 64 MMHG | WEIGHT: 197.31 LBS | BODY MASS INDEX: 27.62 KG/M2

## 2022-11-30 PROCEDURE — 99214 OFFICE O/P EST MOD 30 MIN: CPT

## 2022-11-30 NOTE — HISTORY OF PRESENT ILLNESS
[FreeTextEntry8] : Seen at walk-in 6 days ago with cough fever chest was clear however patient had COVID recently and was treated with doxycycline benzonatate had chest x-ray following day which was negative.  Cough is now subsiding.  No fever no shortness of breath\par \par Physical exam there are scant bilateral rhonchi.  Cough is loose\par \par Resolving bronchitis recommend fluids Mucinex follow-up as needed

## 2023-02-27 ENCOUNTER — RX ONLY (RX ONLY)
Age: 78
End: 2023-02-27

## 2023-02-27 ENCOUNTER — OFFICE (OUTPATIENT)
Dept: URBAN - METROPOLITAN AREA CLINIC 12 | Facility: CLINIC | Age: 78
Setting detail: OPHTHALMOLOGY
End: 2023-02-27
Payer: MEDICARE

## 2023-02-27 VITALS — HEIGHT: 60 IN

## 2023-02-27 DIAGNOSIS — H26.493: ICD-10-CM

## 2023-02-27 PROCEDURE — 99204 OFFICE O/P NEW MOD 45 MIN: CPT | Performed by: OPHTHALMOLOGY

## 2023-02-27 ASSESSMENT — CONFRONTATIONAL VISUAL FIELD TEST (CVF)
OS_FINDINGS: FULL
OD_FINDINGS: FULL

## 2023-02-27 ASSESSMENT — REFRACTION_MANIFEST
OS_SPHERE: PLANO
OD_AXIS: 089
OS_VA1: 20/30
OD_SPHERE: PLANO
OS_CYLINDER: -1.25
OS_AXIS: 074
OD_CYLINDER: -3.00

## 2023-02-27 ASSESSMENT — REFRACTION_AUTOREFRACTION
OS_SPHERE: PLANO
OS_CYLINDER: -1.25
OS_AXIS: 074
OD_AXIS: 089
OD_SPHERE: PLANO
OD_CYLINDER: -3.00

## 2023-02-27 ASSESSMENT — VISUAL ACUITY
OD_BCVA: 20/30-1
OS_BCVA: 20/70-2

## 2023-02-27 ASSESSMENT — REFRACTION_CURRENTRX
OS_VPRISM_DIRECTION: SV
OD_SPHERE: -2.50
OD_AXIS: 091
OS_CYLINDER: -1.50
OS_OVR_VA: 20/
OD_OVR_VA: 20/
OS_AXIS: 094
OD_VPRISM_DIRECTION: SV
OS_SPHERE: -2.75
OD_CYLINDER: -1.50

## 2023-02-27 ASSESSMENT — KERATOMETRY
OD_K1POWER_DIOPTERS: 41.25
OS_K1POWER_DIOPTERS: 41.75
OS_K2POWER_DIOPTERS: 42.75
OD_AXISANGLE_DEGREES: 003
OS_AXISANGLE_DEGREES: 130
OD_K2POWER_DIOPTERS: 42.50

## 2023-03-17 ENCOUNTER — ASC (OUTPATIENT)
Dept: URBAN - METROPOLITAN AREA SURGERY 8 | Facility: SURGERY | Age: 78
Setting detail: OPHTHALMOLOGY
End: 2023-03-17
Payer: MEDICARE

## 2023-03-17 DIAGNOSIS — H26.491: ICD-10-CM

## 2023-03-17 PROCEDURE — 66821 AFTER CATARACT LASER SURGERY: CPT | Performed by: OPHTHALMOLOGY

## 2023-03-17 ASSESSMENT — CONFRONTATIONAL VISUAL FIELD TEST (CVF)
OD_FINDINGS: FULL
OS_FINDINGS: FULL

## 2023-03-18 ENCOUNTER — RX ONLY (RX ONLY)
Age: 78
End: 2023-03-18

## 2023-03-18 PROBLEM — H26.491 POSTERIOR CAPSULAR OPACIFICATION;  , RIGHT EYE: Status: ACTIVE | Noted: 2023-03-17

## 2023-03-18 ASSESSMENT — REFRACTION_MANIFEST
OD_AXIS: 089
OS_SPHERE: PLANO
OS_AXIS: 074
OD_SPHERE: PLANO
OS_VA1: 20/30
OS_CYLINDER: -1.25
OD_CYLINDER: -3.00

## 2023-03-18 ASSESSMENT — KERATOMETRY
OD_K2POWER_DIOPTERS: 42.50
OS_AXISANGLE_DEGREES: 130
OD_K1POWER_DIOPTERS: 41.25
OS_K2POWER_DIOPTERS: 42.75
OD_AXISANGLE_DEGREES: 003
OS_K1POWER_DIOPTERS: 41.75

## 2023-03-18 ASSESSMENT — REFRACTION_AUTOREFRACTION
OD_AXIS: 089
OS_AXIS: 074
OD_CYLINDER: -3.00
OS_SPHERE: PLANO
OS_CYLINDER: -1.25
OD_SPHERE: PLANO

## 2023-03-18 ASSESSMENT — REFRACTION_CURRENTRX
OS_CYLINDER: -1.50
OD_OVR_VA: 20/
OS_AXIS: 094
OD_CYLINDER: -1.50
OD_SPHERE: -2.50
OS_VPRISM_DIRECTION: SV
OD_AXIS: 091
OD_VPRISM_DIRECTION: SV
OS_OVR_VA: 20/
OS_SPHERE: -2.75

## 2023-03-18 ASSESSMENT — VISUAL ACUITY
OS_BCVA: 20/70-2
OD_BCVA: 20/30-1

## 2023-04-21 ENCOUNTER — ASC (OUTPATIENT)
Dept: URBAN - METROPOLITAN AREA SURGERY 8 | Facility: SURGERY | Age: 78
Setting detail: OPHTHALMOLOGY
End: 2023-04-21
Payer: MEDICARE

## 2023-04-21 DIAGNOSIS — H26.492: ICD-10-CM

## 2023-04-21 PROCEDURE — 66821 AFTER CATARACT LASER SURGERY: CPT | Performed by: OPHTHALMOLOGY

## 2023-04-23 ENCOUNTER — RX ONLY (RX ONLY)
Age: 78
End: 2023-04-23

## 2023-04-23 PROBLEM — H26.492 POSTERIOR CAPSULAR OPACIFICATION;  ,  , LEFT EYE: Status: ACTIVE | Noted: 2023-04-21

## 2023-04-23 ASSESSMENT — REFRACTION_MANIFEST
OD_CYLINDER: -3.00
OS_SPHERE: PLANO
OD_SPHERE: PLANO
OD_AXIS: 089
OS_CYLINDER: -1.25
OS_AXIS: 074
OS_VA1: 20/30

## 2023-04-23 ASSESSMENT — REFRACTION_AUTOREFRACTION
OD_AXIS: 089
OS_CYLINDER: -1.25
OS_AXIS: 074
OD_SPHERE: PLANO
OD_CYLINDER: -3.00
OS_SPHERE: PLANO

## 2023-04-23 ASSESSMENT — KERATOMETRY
OD_K2POWER_DIOPTERS: 42.50
OS_K1POWER_DIOPTERS: 41.75
OD_AXISANGLE_DEGREES: 003
OS_K2POWER_DIOPTERS: 42.75
OD_K1POWER_DIOPTERS: 41.25
OS_AXISANGLE_DEGREES: 130

## 2023-04-23 ASSESSMENT — REFRACTION_CURRENTRX
OD_SPHERE: -2.50
OS_OVR_VA: 20/
OS_SPHERE: -2.75
OD_CYLINDER: -1.50
OD_VPRISM_DIRECTION: SV
OS_VPRISM_DIRECTION: SV
OD_OVR_VA: 20/
OD_AXIS: 091
OS_CYLINDER: -1.50
OS_AXIS: 094

## 2023-04-23 ASSESSMENT — VISUAL ACUITY
OD_BCVA: 20/30-1
OS_BCVA: 20/70-2

## 2023-04-24 ENCOUNTER — LABORATORY RESULT (OUTPATIENT)
Age: 78
End: 2023-04-24

## 2023-04-24 ENCOUNTER — APPOINTMENT (OUTPATIENT)
Dept: FAMILY MEDICINE | Facility: CLINIC | Age: 78
End: 2023-04-24
Payer: MEDICARE

## 2023-04-24 VITALS
WEIGHT: 201 LBS | SYSTOLIC BLOOD PRESSURE: 126 MMHG | HEIGHT: 71 IN | BODY MASS INDEX: 28.14 KG/M2 | HEART RATE: 56 BPM | OXYGEN SATURATION: 98 % | DIASTOLIC BLOOD PRESSURE: 80 MMHG | TEMPERATURE: 97.2 F | RESPIRATION RATE: 16 BRPM

## 2023-04-24 DIAGNOSIS — R82.998 OTHER ABNORMAL FINDINGS IN URINE: ICD-10-CM

## 2023-04-24 DIAGNOSIS — J40 BRONCHITIS, NOT SPECIFIED AS ACUTE OR CHRONIC: ICD-10-CM

## 2023-04-24 PROCEDURE — 99214 OFFICE O/P EST MOD 30 MIN: CPT

## 2023-04-24 RX ORDER — EVOLOCUMAB 140 MG/ML
140 INJECTION, SOLUTION SUBCUTANEOUS
Refills: 0 | Status: ACTIVE | COMMUNITY

## 2023-04-24 NOTE — PHYSICAL EXAM
[Normal] : normal rate, regular rhythm, normal S1 and S2 and no murmur heard [de-identified] : Slightly coarse breath sounds no wheezing

## 2023-04-24 NOTE — HISTORY OF PRESENT ILLNESS
[FreeTextEntry8] : 1 week of cough seen at walk-in several days ago given benzonatate then began wheezing put on Medrol Dosepak symptoms are improving however now diarrhea on Augmentin.  Patient has history of recurrent diarrhea.  Patient is afebrile.  Discontinue Augmentin use probiotic and Imodium as needed\par \par Urine seems blood-tinged and dark.  No dysuria no frequency no jaundice check urine analysis check renal and liver function

## 2023-04-25 LAB
ALBUMIN SERPL ELPH-MCNC: 4.7 G/DL
ALP BLD-CCNC: 58 U/L
ALT SERPL-CCNC: 24 U/L
ANION GAP SERPL CALC-SCNC: 15 MMOL/L
APPEARANCE: ABNORMAL
AST SERPL-CCNC: 31 U/L
BILIRUB SERPL-MCNC: 0.4 MG/DL
BILIRUBIN URINE: ABNORMAL
BLOOD URINE: ABNORMAL
BUN SERPL-MCNC: 18 MG/DL
CALCIUM SERPL-MCNC: 10.3 MG/DL
CHLORIDE SERPL-SCNC: 104 MMOL/L
CO2 SERPL-SCNC: 23 MMOL/L
COLOR: ABNORMAL
CREAT SERPL-MCNC: 0.99 MG/DL
EGFR: 78 ML/MIN/1.73M2
GLUCOSE QUALITATIVE U: NEGATIVE MG/DL
GLUCOSE SERPL-MCNC: 97 MG/DL
KETONES URINE: NEGATIVE MG/DL
LEUKOCYTE ESTERASE URINE: ABNORMAL
NITRITE URINE: NEGATIVE
PH URINE: 5.5
POTASSIUM SERPL-SCNC: 4.5 MMOL/L
PROT SERPL-MCNC: 7.2 G/DL
PROTEIN URINE: 100 MG/DL
SODIUM SERPL-SCNC: 141 MMOL/L
SPECIFIC GRAVITY URINE: 1.02
UROBILINOGEN URINE: 0.2 MG/DL

## 2023-05-05 NOTE — PATIENT PROFILE ADULT - NSPROSPIRITUALVALUESFT_GEN_A_NUR
Pt saw [address] yesterday and didn't realize he doesn't have any more of his \"Dicyclonie\" (Bentyl) pt had a loose stool this morning asking if [address] will give him a refill?    [address], [phone], [address] [place] [address]   Phone: [phone] Fax: [phone] Pt denies

## 2023-05-17 ENCOUNTER — APPOINTMENT (OUTPATIENT)
Dept: UROLOGY | Facility: CLINIC | Age: 78
End: 2023-05-17
Payer: MEDICARE

## 2023-05-17 VITALS
RESPIRATION RATE: 16 BRPM | HEART RATE: 60 BPM | HEIGHT: 71 IN | DIASTOLIC BLOOD PRESSURE: 78 MMHG | BODY MASS INDEX: 28.14 KG/M2 | WEIGHT: 201 LBS | SYSTOLIC BLOOD PRESSURE: 172 MMHG | TEMPERATURE: 98 F

## 2023-05-17 DIAGNOSIS — N40.1 BENIGN PROSTATIC HYPERPLASIA WITH LOWER URINARY TRACT SYMPMS: ICD-10-CM

## 2023-05-17 DIAGNOSIS — R31.0 GROSS HEMATURIA: ICD-10-CM

## 2023-05-17 DIAGNOSIS — N13.8 BENIGN PROSTATIC HYPERPLASIA WITH LOWER URINARY TRACT SYMPMS: ICD-10-CM

## 2023-05-17 PROCEDURE — 99204 OFFICE O/P NEW MOD 45 MIN: CPT

## 2023-05-17 NOTE — ASSESSMENT
[FreeTextEntry1] : 78 year old man with gross hematuria. We discussed that the differential diagnosis includes both benign and malignant conditions including renal stones, BPH, urinary tract infections, and cancer of the bladder or ureter or kidney. Cystoscopy was recommended to rule out pathology in the bladder. CT Urogram was recommended to evaluate for presence of nephroureteral stones or malignancies. Urinalysis and urine culture were recommended to check for urinary tract infection. Pt agrees and understands. \par \par For BPH, he is not bothered by symptoms so will observe.

## 2023-05-17 NOTE — HISTORY OF PRESENT ILLNESS
[FreeTextEntry1] : 78 year old man  with complaint of gross hematuria. This began 4/2023. He had URI, presented to urgent care cenrter and was started on medication, he does not recall which. Then developed drown urine with remained dark for days. UA sent by Dr Dowell 3000 RBCs/hpf. UCx was neg. Urine has cleared somewhat but still brown to red. Mild frequency, weak stream, nocturia x1. not bothersome.\par No gross hematuria, no dysuria, no frequency, no urgency, no hesitancy, no straining. No incontinence. \par No fevers, no chills, no nausea, no vomiting, no flank pain. \par \par No family history contributory to gross hematuria. \par \par SERUM CR \par 0.99   04/2023

## 2023-05-18 LAB
APPEARANCE: CLEAR
BACTERIA: NEGATIVE /HPF
BILIRUBIN URINE: NEGATIVE
BLOOD URINE: ABNORMAL
CAST: 2 /LPF
COLOR: NORMAL
EPITHELIAL CELLS: 1 /HPF
GLUCOSE QUALITATIVE U: NEGATIVE MG/DL
KETONES URINE: ABNORMAL MG/DL
LEUKOCYTE ESTERASE URINE: NEGATIVE
MICROSCOPIC-UA: NORMAL
NITRITE URINE: NEGATIVE
PH URINE: 5.5
PROTEIN URINE: 30 MG/DL
RED BLOOD CELLS URINE: 31 /HPF
SPECIFIC GRAVITY URINE: 1.02
UROBILINOGEN URINE: 0.2 MG/DL
WHITE BLOOD CELLS URINE: 7 /HPF

## 2023-05-19 LAB — BACTERIA UR CULT: NORMAL

## 2023-05-31 ENCOUNTER — OUTPATIENT (OUTPATIENT)
Dept: OUTPATIENT SERVICES | Facility: HOSPITAL | Age: 78
LOS: 1 days | End: 2023-05-31
Payer: MEDICARE

## 2023-05-31 ENCOUNTER — APPOINTMENT (OUTPATIENT)
Dept: CT IMAGING | Facility: CLINIC | Age: 78
End: 2023-05-31
Payer: MEDICARE

## 2023-05-31 DIAGNOSIS — R31.0 GROSS HEMATURIA: ICD-10-CM

## 2023-05-31 PROCEDURE — 74178 CT ABD&PLV WO CNTR FLWD CNTR: CPT

## 2023-05-31 PROCEDURE — 74178 CT ABD&PLV WO CNTR FLWD CNTR: CPT | Mod: 26,MH

## 2023-06-12 ENCOUNTER — APPOINTMENT (OUTPATIENT)
Dept: UROLOGY | Facility: CLINIC | Age: 78
End: 2023-06-12

## 2023-06-16 ENCOUNTER — APPOINTMENT (OUTPATIENT)
Dept: UROLOGY | Facility: CLINIC | Age: 78
End: 2023-06-16
Payer: MEDICARE

## 2023-06-16 VITALS
WEIGHT: 205 LBS | HEART RATE: 67 BPM | SYSTOLIC BLOOD PRESSURE: 119 MMHG | HEIGHT: 71 IN | DIASTOLIC BLOOD PRESSURE: 76 MMHG | BODY MASS INDEX: 28.7 KG/M2

## 2023-06-16 PROCEDURE — 99213 OFFICE O/P EST LOW 20 MIN: CPT

## 2023-06-16 NOTE — HISTORY OF PRESENT ILLNESS
[FreeTextEntry1] : 78 year old man  with complaint of gross hematuria. This began 4/2023. He had URI, presented to urgent care cenrter and was started on medication, he does not recall which. Then developed drown urine with remained dark for days. UA sent by Dr Dowell 3000 RBCs/hpf. UCx was neg. Urine has cleared somewhat but still brown to red. Mild frequency, weak stream, nocturia x1. not bothersome.\par No gross hematuria, no dysuria, no frequency, no urgency, no hesitancy, no straining. No incontinence. \par No fevers, no chills, no nausea, no vomiting, no flank pain. No family history contributory to gross hematuria. \par \par 06/16/2023: Patient presents for follow up. He is without complaints. No hematuria. No new or bothersome LUTS. 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 \par \par SERUM CR \par 0.99   04/2023

## 2023-06-16 NOTE — ASSESSMENT
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 WIll schedule for RIGHT ureteroscopy and resection of mass. Pt agrees.

## 2023-06-27 ENCOUNTER — OUTPATIENT (OUTPATIENT)
Dept: OUTPATIENT SERVICES | Facility: HOSPITAL | Age: 78
LOS: 1 days | End: 2023-06-27
Payer: MEDICARE

## 2023-06-27 VITALS
HEART RATE: 63 BPM | WEIGHT: 207.9 LBS | OXYGEN SATURATION: 100 % | DIASTOLIC BLOOD PRESSURE: 75 MMHG | RESPIRATION RATE: 16 BRPM | SYSTOLIC BLOOD PRESSURE: 144 MMHG | TEMPERATURE: 97 F | HEIGHT: 70 IN

## 2023-06-27 DIAGNOSIS — Z01.818 ENCOUNTER FOR OTHER PREPROCEDURAL EXAMINATION: ICD-10-CM

## 2023-06-27 DIAGNOSIS — Z98.890 OTHER SPECIFIED POSTPROCEDURAL STATES: Chronic | ICD-10-CM

## 2023-06-27 DIAGNOSIS — Z95.5 PRESENCE OF CORONARY ANGIOPLASTY IMPLANT AND GRAFT: Chronic | ICD-10-CM

## 2023-06-27 LAB
ANION GAP SERPL CALC-SCNC: 6 MMOL/L — SIGNIFICANT CHANGE UP (ref 5–17)
APPEARANCE UR: CLEAR — SIGNIFICANT CHANGE UP
APTT BLD: 34.8 SEC — SIGNIFICANT CHANGE UP (ref 27.5–35.5)
BASOPHILS # BLD AUTO: 0.03 K/UL — SIGNIFICANT CHANGE UP (ref 0–0.2)
BASOPHILS NFR BLD AUTO: 0.5 % — SIGNIFICANT CHANGE UP (ref 0–2)
BILIRUB UR-MCNC: NEGATIVE — SIGNIFICANT CHANGE UP
BLD GP AB SCN SERPL QL: SIGNIFICANT CHANGE UP
BUN SERPL-MCNC: 26 MG/DL — HIGH (ref 7–23)
CALCIUM SERPL-MCNC: 9.4 MG/DL — SIGNIFICANT CHANGE UP (ref 8.5–10.1)
CHLORIDE SERPL-SCNC: 109 MMOL/L — HIGH (ref 96–108)
CO2 SERPL-SCNC: 26 MMOL/L — SIGNIFICANT CHANGE UP (ref 22–31)
COLOR SPEC: YELLOW — SIGNIFICANT CHANGE UP
CREAT SERPL-MCNC: 1.28 MG/DL — SIGNIFICANT CHANGE UP (ref 0.5–1.3)
DIFF PNL FLD: NEGATIVE — SIGNIFICANT CHANGE UP
EGFR: 57 ML/MIN/1.73M2 — LOW
EOSINOPHIL # BLD AUTO: 0.11 K/UL — SIGNIFICANT CHANGE UP (ref 0–0.5)
EOSINOPHIL NFR BLD AUTO: 1.9 % — SIGNIFICANT CHANGE UP (ref 0–6)
GLUCOSE SERPL-MCNC: 107 MG/DL — HIGH (ref 70–99)
GLUCOSE UR QL: NEGATIVE — SIGNIFICANT CHANGE UP
HCT VFR BLD CALC: 41.6 % — SIGNIFICANT CHANGE UP (ref 39–50)
HGB BLD-MCNC: 14.4 G/DL — SIGNIFICANT CHANGE UP (ref 13–17)
IMM GRANULOCYTES NFR BLD AUTO: 0.2 % — SIGNIFICANT CHANGE UP (ref 0–0.9)
INR BLD: 1.17 RATIO — HIGH (ref 0.88–1.16)
KETONES UR-MCNC: NEGATIVE — SIGNIFICANT CHANGE UP
LEUKOCYTE ESTERASE UR-ACNC: NEGATIVE — SIGNIFICANT CHANGE UP
LYMPHOCYTES # BLD AUTO: 1.36 K/UL — SIGNIFICANT CHANGE UP (ref 1–3.3)
LYMPHOCYTES # BLD AUTO: 23.4 % — SIGNIFICANT CHANGE UP (ref 13–44)
MCHC RBC-ENTMCNC: 33 PG — SIGNIFICANT CHANGE UP (ref 27–34)
MCHC RBC-ENTMCNC: 34.6 GM/DL — SIGNIFICANT CHANGE UP (ref 32–36)
MCV RBC AUTO: 95.2 FL — SIGNIFICANT CHANGE UP (ref 80–100)
MONOCYTES # BLD AUTO: 0.59 K/UL — SIGNIFICANT CHANGE UP (ref 0–0.9)
MONOCYTES NFR BLD AUTO: 10.1 % — SIGNIFICANT CHANGE UP (ref 2–14)
NEUTROPHILS # BLD AUTO: 3.72 K/UL — SIGNIFICANT CHANGE UP (ref 1.8–7.4)
NEUTROPHILS NFR BLD AUTO: 63.9 % — SIGNIFICANT CHANGE UP (ref 43–77)
NITRITE UR-MCNC: NEGATIVE — SIGNIFICANT CHANGE UP
PH UR: 5 — SIGNIFICANT CHANGE UP (ref 5–8)
PLATELET # BLD AUTO: 185 K/UL — SIGNIFICANT CHANGE UP (ref 150–400)
POTASSIUM SERPL-MCNC: 4.8 MMOL/L — SIGNIFICANT CHANGE UP (ref 3.5–5.3)
POTASSIUM SERPL-SCNC: 4.8 MMOL/L — SIGNIFICANT CHANGE UP (ref 3.5–5.3)
PROT UR-MCNC: NEGATIVE — SIGNIFICANT CHANGE UP
PROTHROM AB SERPL-ACNC: 13.6 SEC — HIGH (ref 10.5–13.4)
RBC # BLD: 4.37 M/UL — SIGNIFICANT CHANGE UP (ref 4.2–5.8)
RBC # FLD: 11.9 % — SIGNIFICANT CHANGE UP (ref 10.3–14.5)
SODIUM SERPL-SCNC: 141 MMOL/L — SIGNIFICANT CHANGE UP (ref 135–145)
SP GR SPEC: 1.02 — SIGNIFICANT CHANGE UP (ref 1.01–1.02)
UROBILINOGEN FLD QL: NEGATIVE — SIGNIFICANT CHANGE UP
WBC # BLD: 5.82 K/UL — SIGNIFICANT CHANGE UP (ref 3.8–10.5)
WBC # FLD AUTO: 5.82 K/UL — SIGNIFICANT CHANGE UP (ref 3.8–10.5)

## 2023-06-27 PROCEDURE — 85610 PROTHROMBIN TIME: CPT

## 2023-06-27 PROCEDURE — 85730 THROMBOPLASTIN TIME PARTIAL: CPT

## 2023-06-27 PROCEDURE — 86850 RBC ANTIBODY SCREEN: CPT

## 2023-06-27 PROCEDURE — 36415 COLL VENOUS BLD VENIPUNCTURE: CPT

## 2023-06-27 PROCEDURE — 86901 BLOOD TYPING SEROLOGIC RH(D): CPT

## 2023-06-27 PROCEDURE — 86900 BLOOD TYPING SEROLOGIC ABO: CPT

## 2023-06-27 PROCEDURE — 99214 OFFICE O/P EST MOD 30 MIN: CPT | Mod: 25

## 2023-06-27 PROCEDURE — 87086 URINE CULTURE/COLONY COUNT: CPT

## 2023-06-27 PROCEDURE — 80048 BASIC METABOLIC PNL TOTAL CA: CPT

## 2023-06-27 PROCEDURE — 81003 URINALYSIS AUTO W/O SCOPE: CPT

## 2023-06-27 PROCEDURE — 85025 COMPLETE CBC W/AUTO DIFF WBC: CPT

## 2023-06-27 NOTE — H&P PST ADULT - NSICDXFAMILYHX_GEN_ALL_CORE_FT
FAMILY HISTORY:  Father  Still living? No  FH: lung cancer, Age at diagnosis: Age Unknown    Mother  Still living? No  FH: brain tumor, Age at diagnosis: Age Unknown

## 2023-06-27 NOTE — H&P PST ADULT - HISTORY OF PRESENT ILLNESS
Patient is 78 year old male who presents to [facility] in no acute distress. He explain how there was blood and "crystal" found in his urine. He was referred to Dr. To - CT performed which showed suspicious areas around right kidney. It was recommended at point to have surgical intervention. Patient denies dysuria, hematuria, increased frequency/urgency, fever, chills. He is scheduled to have cystoscopy, right ureteroscopy, resection of ureteral tumor, right ureteral stent placement.

## 2023-06-27 NOTE — H&P PST ADULT - ASSESSMENT
Patient is 78 year old male who presents to [facility] in no acute distress. He explain how there was blood and "crystal" found in his urine. He was referred to Dr. To - CT performed which showed suspicious areas around right kidney. It was recommended at point to have surgical intervention. Patient denies dysuria, hematuria, increased frequency/urgency, fever, chills. He is scheduled to have cystoscopy, right ureteroscopy, resection of ureteral tumor, right ureteral stent placement.       Plan:  1. PST instructions given ; NPO status/ instructions to be given by ASU   2. Pt instructed to take following meds on day of surgery:   3. Pt instructed to take routine evening medications unless indicated   4. Stop NSAIDS ( Aspirin, Aleve, Motrin, Mobic, Diclofenac), herbal supplements, MVI, Vitamins, fish oil 7 days prior to surgery unless directed by surgeon or cardiologist; Eliquis and aspirin per cardiology   5. Medical Optimization with Dr. Dowell, Cardiac optimization with MADONNA Seals NP   6. EZ wash instructions given & mupirocin instructions given.  7. Labs, EKG, CXR as per surgeon request

## 2023-06-27 NOTE — H&P PST ADULT - NSICDXPASTMEDICALHX_GEN_ALL_CORE_FT
PAST MEDICAL HISTORY:  Acute myocardial infarction     Atrial fibrillation     Clostridium difficile infection     Dry eyes     High cholesterol     HTN (hypertension)

## 2023-06-28 DIAGNOSIS — Z01.818 ENCOUNTER FOR OTHER PREPROCEDURAL EXAMINATION: ICD-10-CM

## 2023-06-28 LAB
CULTURE RESULTS: NO GROWTH — SIGNIFICANT CHANGE UP
SPECIMEN SOURCE: SIGNIFICANT CHANGE UP

## 2023-06-30 ENCOUNTER — APPOINTMENT (OUTPATIENT)
Dept: FAMILY MEDICINE | Facility: CLINIC | Age: 78
End: 2023-06-30
Payer: MEDICARE

## 2023-06-30 VITALS
WEIGHT: 206.13 LBS | HEIGHT: 71 IN | SYSTOLIC BLOOD PRESSURE: 120 MMHG | OXYGEN SATURATION: 97 % | DIASTOLIC BLOOD PRESSURE: 72 MMHG | HEART RATE: 65 BPM | TEMPERATURE: 97.8 F | BODY MASS INDEX: 28.86 KG/M2

## 2023-06-30 DIAGNOSIS — Z01.818 ENCOUNTER FOR OTHER PREPROCEDURAL EXAMINATION: ICD-10-CM

## 2023-06-30 DIAGNOSIS — Z00.00 ENCOUNTER FOR GENERAL ADULT MEDICAL EXAMINATION W/OUT ABNORMAL FINDINGS: ICD-10-CM

## 2023-06-30 DIAGNOSIS — I48.20 CHRONIC ATRIAL FIBRILLATION, UNSP: ICD-10-CM

## 2023-06-30 PROCEDURE — 99214 OFFICE O/P EST MOD 30 MIN: CPT

## 2023-06-30 RX ORDER — ALIROCUMAB 75 MG/ML
75 INJECTION, SOLUTION SUBCUTANEOUS
Qty: 4 | Refills: 0 | Status: DISCONTINUED | COMMUNITY
Start: 2020-04-14 | End: 2023-06-30

## 2023-06-30 RX ORDER — LOSARTAN POTASSIUM 100 MG/1
100 TABLET, FILM COATED ORAL DAILY
Qty: 90 | Refills: 3 | Status: ACTIVE | COMMUNITY

## 2023-06-30 RX ORDER — METOPROLOL TARTRATE 25 MG/1
25 TABLET, FILM COATED ORAL TWICE DAILY
Qty: 180 | Refills: 3 | Status: ACTIVE | COMMUNITY

## 2023-06-30 NOTE — HISTORY OF PRESENT ILLNESS
[Atrial Fibrillation] : atrial fibrillation [No Pertinent Pulmonary History] : no history of asthma, COPD, sleep apnea, or smoking [Chronic Anticoagulation] : chronic anticoagulation [(Patient denies any chest pain, claudication, dyspnea on exertion, orthopnea, palpitations or syncope)] : Patient denies any chest pain, claudication, dyspnea on exertion, orthopnea, palpitations or syncope [Chronic Kidney Disease] : no chronic kidney disease [Diabetes] : no diabetes [FreeTextEntry1] : Ureteroscopy and lesion excision [FreeTextEntry2] : July 5 [FreeTextEntry3] : Dr. To [FreeTextEntry4] : Episode of gross hematuria last April.  CAT scan shows ureteral lesion requiring biopsy and or excision\par \par History of atrial fibrillation patient on Eliquis he is in sinus rhythm the majority of the time [place] 3 times a week without cardiovascular symptoms.  Patient had a cardiac stent x3 in 2019.  He has been cleared for this procedure by cardiology\par \par Patient had colonoscopy approximately 4 years ago surgery was uneventful

## 2023-06-30 NOTE — PHYSICAL EXAM
[Regular Rhythm] : with a regular rhythm [Normal] : normal rate, regular rhythm, normal S1 and S2 and no murmur heard [Pedal Pulses Present] : the pedal pulses are present [No Edema] : there was no peripheral edema

## 2023-06-30 NOTE — ASSESSMENT
[Patient Optimized for Surgery] : Patient optimized for surgery [No Further Testing Recommended] : no further testing recommended [FreeTextEntry4] : Patient will hold Eliquis 72 hours prior to procedure.  He will continue on aspirin in the perioperative period

## 2023-07-05 ENCOUNTER — TRANSCRIPTION ENCOUNTER (OUTPATIENT)
Age: 78
End: 2023-07-05

## 2023-07-05 ENCOUNTER — OUTPATIENT (OUTPATIENT)
Dept: INPATIENT UNIT | Facility: HOSPITAL | Age: 78
LOS: 1 days | Discharge: HOME | End: 2023-07-05
Payer: MEDICARE

## 2023-07-05 ENCOUNTER — APPOINTMENT (OUTPATIENT)
Dept: UROLOGY | Facility: HOSPITAL | Age: 78
End: 2023-07-05

## 2023-07-05 VITALS
DIASTOLIC BLOOD PRESSURE: 84 MMHG | HEIGHT: 70 IN | WEIGHT: 207.9 LBS | OXYGEN SATURATION: 97 % | SYSTOLIC BLOOD PRESSURE: 154 MMHG | TEMPERATURE: 98 F | RESPIRATION RATE: 16 BRPM | HEART RATE: 57 BPM

## 2023-07-05 VITALS
SYSTOLIC BLOOD PRESSURE: 154 MMHG | HEART RATE: 58 BPM | TEMPERATURE: 99 F | DIASTOLIC BLOOD PRESSURE: 80 MMHG | OXYGEN SATURATION: 100 % | RESPIRATION RATE: 16 BRPM

## 2023-07-05 DIAGNOSIS — Z95.5 PRESENCE OF CORONARY ANGIOPLASTY IMPLANT AND GRAFT: Chronic | ICD-10-CM

## 2023-07-05 DIAGNOSIS — N28.89 OTHER SPECIFIED DISORDERS OF KIDNEY AND URETER: ICD-10-CM

## 2023-07-05 DIAGNOSIS — Z98.890 OTHER SPECIFIED POSTPROCEDURAL STATES: Chronic | ICD-10-CM

## 2023-07-05 PROCEDURE — 52351 CYSTOURETERO & OR PYELOSCOPE: CPT | Mod: RT

## 2023-07-05 PROCEDURE — C1769: CPT

## 2023-07-05 PROCEDURE — C2617: CPT

## 2023-07-05 PROCEDURE — 74420 UROGRAPHY RTRGR +-KUB: CPT | Mod: 26,RT

## 2023-07-05 PROCEDURE — 76000 FLUOROSCOPY <1 HR PHYS/QHP: CPT

## 2023-07-05 PROCEDURE — 52332 CYSTOSCOPY AND TREATMENT: CPT | Mod: RT

## 2023-07-05 RX ORDER — OXYCODONE HYDROCHLORIDE 5 MG/1
5 TABLET ORAL ONCE
Refills: 0 | Status: DISCONTINUED | OUTPATIENT
Start: 2023-07-05 | End: 2023-07-05

## 2023-07-05 RX ORDER — ONDANSETRON 8 MG/1
4 TABLET, FILM COATED ORAL EVERY 6 HOURS
Refills: 0 | Status: DISCONTINUED | OUTPATIENT
Start: 2023-07-05 | End: 2023-07-05

## 2023-07-05 RX ORDER — SODIUM CHLORIDE 9 MG/ML
3 INJECTION INTRAMUSCULAR; INTRAVENOUS; SUBCUTANEOUS EVERY 8 HOURS
Refills: 0 | Status: DISCONTINUED | OUTPATIENT
Start: 2023-07-05 | End: 2023-07-05

## 2023-07-05 RX ORDER — SODIUM CHLORIDE 9 MG/ML
1000 INJECTION, SOLUTION INTRAVENOUS
Refills: 0 | Status: DISCONTINUED | OUTPATIENT
Start: 2023-07-05 | End: 2023-07-05

## 2023-07-05 RX ORDER — ACETAMINOPHEN 500 MG
975 TABLET ORAL ONCE
Refills: 0 | Status: COMPLETED | OUTPATIENT
Start: 2023-07-05 | End: 2023-07-05

## 2023-07-05 RX ORDER — FAMOTIDINE 10 MG/ML
20 INJECTION INTRAVENOUS ONCE
Refills: 0 | Status: COMPLETED | OUTPATIENT
Start: 2023-07-05 | End: 2023-07-05

## 2023-07-05 RX ORDER — OXYBUTYNIN CHLORIDE 5 MG
1 TABLET ORAL
Qty: 30 | Refills: 0
Start: 2023-07-05 | End: 2023-07-14

## 2023-07-05 RX ORDER — PHENAZOPYRIDINE HCL 100 MG
1 TABLET ORAL
Qty: 9 | Refills: 0
Start: 2023-07-05 | End: 2023-07-07

## 2023-07-05 RX ORDER — FENTANYL CITRATE 50 UG/ML
50 INJECTION INTRAVENOUS
Refills: 0 | Status: DISCONTINUED | OUTPATIENT
Start: 2023-07-05 | End: 2023-07-05

## 2023-07-05 RX ORDER — IBUPROFEN 200 MG
1 TABLET ORAL
Qty: 12 | Refills: 0
Start: 2023-07-05 | End: 2023-07-07

## 2023-07-05 RX ORDER — PHENAZOPYRIDINE HCL 100 MG
200 TABLET ORAL ONCE
Refills: 0 | Status: COMPLETED | OUTPATIENT
Start: 2023-07-05 | End: 2023-07-05

## 2023-07-05 RX ORDER — OXYBUTYNIN CHLORIDE 5 MG
5 TABLET ORAL ONCE
Refills: 0 | Status: COMPLETED | OUTPATIENT
Start: 2023-07-05 | End: 2023-07-05

## 2023-07-05 RX ORDER — CEFUROXIME AXETIL 250 MG
1 TABLET ORAL
Qty: 6 | Refills: 0
Start: 2023-07-05 | End: 2023-07-07

## 2023-07-05 RX ORDER — TAMSULOSIN HYDROCHLORIDE 0.4 MG/1
1 CAPSULE ORAL
Qty: 30 | Refills: 2
Start: 2023-07-05 | End: 2023-10-02

## 2023-07-05 RX ADMIN — FAMOTIDINE 20 MILLIGRAM(S): 10 INJECTION INTRAVENOUS at 12:21

## 2023-07-05 RX ADMIN — FENTANYL CITRATE 50 MICROGRAM(S): 50 INJECTION INTRAVENOUS at 15:02

## 2023-07-05 RX ADMIN — Medication 975 MILLIGRAM(S): at 12:32

## 2023-07-05 RX ADMIN — Medication 5 MILLIGRAM(S): at 15:02

## 2023-07-05 RX ADMIN — FENTANYL CITRATE 50 MICROGRAM(S): 50 INJECTION INTRAVENOUS at 15:30

## 2023-07-05 RX ADMIN — Medication 975 MILLIGRAM(S): at 12:22

## 2023-07-05 RX ADMIN — Medication 200 MILLIGRAM(S): at 15:02

## 2023-07-05 NOTE — BRIEF OPERATIVE NOTE - NSICDXBRIEFPROCEDURE_GEN_ALL_CORE_FT
PROCEDURES:  Ureteroscopy, rigid, with stent insertion 05-Jul-2023 14:49:05  Aamir To  Rigid ureteroscopy 05-Jul-2023 14:49:39  Aamir To

## 2023-07-05 NOTE — ASU PATIENT PROFILE, ADULT - NSICDXPASTMEDICALHX_GEN_ALL_CORE_FT
PAST MEDICAL HISTORY:  Acute myocardial infarction     Atrial fibrillation     Clostridium difficile infection 2 weeks ago, now negative3    Dry eyes     High cholesterol     HTN (hypertension)

## 2023-07-05 NOTE — BRIEF OPERATIVE NOTE - NSICDXBRIEFPOSTOP_GEN_ALL_CORE_FT
POST-OP DIAGNOSIS:  Ureteral lesion, native, right 05-Jul-2023 14:50:22  Aamir To  Ureteral stricture, right 05-Jul-2023 14:50:28  Aamir To

## 2023-07-05 NOTE — ASU DISCHARGE PLAN (ADULT/PEDIATRIC) - CARE PROVIDER_API CALL
Aamir To Ton  Urology  [address] [place] [address], [address]  [place] [address]  Phone: [phone]  Fax: [phone]  Follow Up Time: 1 week

## 2023-07-05 NOTE — ASU DISCHARGE PLAN (ADULT/PEDIATRIC) - NS MD DC FALL RISK RISK
For information on Fall & Injury Prevention, visit: [email or web address] OR  [email or web address] OR  https://www.cdc.gov/steadi/patient.html

## 2023-07-05 NOTE — ASU PATIENT PROFILE, ADULT - NSICDXPASTSURGICALHX_GEN_ALL_CORE_FT
PAST SURGICAL HISTORY:  S/P lumbar laminectomy 2000    Status post Mohs surgery 2019 lip, nose    Stented coronary artery 2016

## 2023-07-05 NOTE — BRIEF OPERATIVE NOTE - OPERATION/FINDINGS
stenotic distal right ureter, did not allow scope to pass into more proximal ureter. Retrograde pyelogram showed filling defect more proximally than where the scope could reach. RPG also showed extrav so decision was made to stop and place stent.

## 2023-07-09 DIAGNOSIS — Z95.5 PRESENCE OF CORONARY ANGIOPLASTY IMPLANT AND GRAFT: ICD-10-CM

## 2023-07-09 DIAGNOSIS — I25.2 OLD MYOCARDIAL INFARCTION: ICD-10-CM

## 2023-07-09 DIAGNOSIS — Z87.891 PERSONAL HISTORY OF NICOTINE DEPENDENCE: ICD-10-CM

## 2023-07-09 DIAGNOSIS — I48.91 UNSPECIFIED ATRIAL FIBRILLATION: ICD-10-CM

## 2023-07-09 DIAGNOSIS — Z79.82 LONG TERM (CURRENT) USE OF ASPIRIN: ICD-10-CM

## 2023-07-09 DIAGNOSIS — I10 ESSENTIAL (PRIMARY) HYPERTENSION: ICD-10-CM

## 2023-07-09 DIAGNOSIS — N28.89 OTHER SPECIFIED DISORDERS OF KIDNEY AND URETER: ICD-10-CM

## 2023-07-09 DIAGNOSIS — Z79.01 LONG TERM (CURRENT) USE OF ANTICOAGULANTS: ICD-10-CM

## 2023-07-09 DIAGNOSIS — E78.00 PURE HYPERCHOLESTEROLEMIA, UNSPECIFIED: ICD-10-CM

## 2023-07-09 DIAGNOSIS — N13.5 CROSSING VESSEL AND STRICTURE OF URETER WITHOUT HYDRONEPHROSIS: ICD-10-CM

## 2023-07-13 ENCOUNTER — APPOINTMENT (OUTPATIENT)
Dept: UROLOGY | Facility: CLINIC | Age: 78
End: 2023-07-13
Payer: MEDICARE

## 2023-07-13 PROCEDURE — 99213 OFFICE O/P EST LOW 20 MIN: CPT

## 2023-07-13 NOTE — ASSESSMENT
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 Ureteroscopy was incomplete due to stenosis so stent was placed for passive dilation. WIll schedule for repeat RIGHT ureteroscopy and resection of mass. Pt agrees.

## 2023-07-13 NOTE — HISTORY OF PRESENT ILLNESS
[FreeTextEntry1] : 78 year old man  with complaint of gross hematuria. This began 4/2023. He had URI, presented to urgent care cenrter and was started on medication, he does not recall which. Then developed drown urine with remained dark for days. UA sent by Dr Dowell 3000 RBCs/hpf. UCx was neg. Urine has cleared somewhat but still brown to red. Mild frequency, weak stream, nocturia x1. not bothersome.\par No gross hematuria, no dysuria, no frequency, no urgency, no hesitancy, no straining. No incontinence. \par No fevers, no chills, no nausea, no vomiting, no flank pain. No family history contributory to gross hematuria. \par \par 06/16/2023: Patient presents for follow up. He is without complaints. No hematuria. No new or bothersome LUTS. 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 \par \par 07/13/2023: Patient presents for follow up. He underwent attempted URS ureteral lesion biopsy and ablation, but ureter was stenotic and scope could not be passed into proximal ureter. Stent placed. He had post op hematuria, but now resolved. Tolerating stent well. No acute or bothersome LUTS.\par \par SERUM CR \par 0.99   04/2023

## 2023-07-14 PROBLEM — E78.00 PURE HYPERCHOLESTEROLEMIA, UNSPECIFIED: Chronic | Status: ACTIVE | Noted: 2023-06-27

## 2023-07-14 PROBLEM — I21.9 ACUTE MYOCARDIAL INFARCTION, UNSPECIFIED: Chronic | Status: ACTIVE | Noted: 2023-06-27

## 2023-07-14 PROBLEM — I48.91 UNSPECIFIED ATRIAL FIBRILLATION: Chronic | Status: ACTIVE | Noted: 2023-06-27

## 2023-07-14 PROBLEM — A49.8 OTHER BACTERIAL INFECTIONS OF UNSPECIFIED SITE: Chronic | Status: ACTIVE | Noted: 2023-06-27

## 2023-07-14 PROBLEM — H04.123 DRY EYE SYNDROME OF BILATERAL LACRIMAL GLANDS: Chronic | Status: ACTIVE | Noted: 2023-06-27

## 2023-07-28 ENCOUNTER — RX CHANGE (OUTPATIENT)
Age: 78
End: 2023-07-28

## 2023-07-31 ENCOUNTER — RX CHANGE (OUTPATIENT)
Age: 78
End: 2023-07-31

## 2023-08-21 ENCOUNTER — OUTPATIENT (OUTPATIENT)
Dept: OUTPATIENT SERVICES | Facility: HOSPITAL | Age: 78
LOS: 1 days | End: 2023-08-21
Payer: MEDICARE

## 2023-08-21 ENCOUNTER — RESULT REVIEW (OUTPATIENT)
Age: 78
End: 2023-08-21

## 2023-08-21 VITALS
TEMPERATURE: 97 F | SYSTOLIC BLOOD PRESSURE: 127 MMHG | HEART RATE: 64 BPM | HEIGHT: 71 IN | DIASTOLIC BLOOD PRESSURE: 69 MMHG | WEIGHT: 210.1 LBS | OXYGEN SATURATION: 96 % | RESPIRATION RATE: 16 BRPM

## 2023-08-21 DIAGNOSIS — N28.89 OTHER SPECIFIED DISORDERS OF KIDNEY AND URETER: ICD-10-CM

## 2023-08-21 DIAGNOSIS — Z01.818 ENCOUNTER FOR OTHER PREPROCEDURAL EXAMINATION: ICD-10-CM

## 2023-08-21 DIAGNOSIS — Z95.5 PRESENCE OF CORONARY ANGIOPLASTY IMPLANT AND GRAFT: Chronic | ICD-10-CM

## 2023-08-21 DIAGNOSIS — Z98.890 OTHER SPECIFIED POSTPROCEDURAL STATES: Chronic | ICD-10-CM

## 2023-08-21 LAB
ANION GAP SERPL CALC-SCNC: 4 MMOL/L — LOW (ref 5–17)
APPEARANCE UR: CLEAR — SIGNIFICANT CHANGE UP
APTT BLD: 39.1 SEC — HIGH (ref 24.5–35.6)
BACTERIA # UR AUTO: ABNORMAL
BASOPHILS # BLD AUTO: 0.02 K/UL — SIGNIFICANT CHANGE UP (ref 0–0.2)
BASOPHILS NFR BLD AUTO: 0.4 % — SIGNIFICANT CHANGE UP (ref 0–2)
BILIRUB UR-MCNC: NEGATIVE — SIGNIFICANT CHANGE UP
BLD GP AB SCN SERPL QL: SIGNIFICANT CHANGE UP
BUN SERPL-MCNC: 30 MG/DL — HIGH (ref 7–23)
CALCIUM SERPL-MCNC: 9.3 MG/DL — SIGNIFICANT CHANGE UP (ref 8.5–10.1)
CHLORIDE SERPL-SCNC: 109 MMOL/L — HIGH (ref 96–108)
CO2 SERPL-SCNC: 26 MMOL/L — SIGNIFICANT CHANGE UP (ref 22–31)
COLOR SPEC: YELLOW — SIGNIFICANT CHANGE UP
CREAT SERPL-MCNC: 1.44 MG/DL — HIGH (ref 0.5–1.3)
DIFF PNL FLD: ABNORMAL
EGFR: 49 ML/MIN/1.73M2 — LOW
EOSINOPHIL # BLD AUTO: 0.08 K/UL — SIGNIFICANT CHANGE UP (ref 0–0.5)
EOSINOPHIL NFR BLD AUTO: 1.7 % — SIGNIFICANT CHANGE UP (ref 0–6)
EPI CELLS # UR: SIGNIFICANT CHANGE UP
GLUCOSE SERPL-MCNC: 145 MG/DL — HIGH (ref 70–99)
GLUCOSE UR QL: NEGATIVE — SIGNIFICANT CHANGE UP
HCT VFR BLD CALC: 40.8 % — SIGNIFICANT CHANGE UP (ref 39–50)
HGB BLD-MCNC: 14.2 G/DL — SIGNIFICANT CHANGE UP (ref 13–17)
IMM GRANULOCYTES NFR BLD AUTO: 0.2 % — SIGNIFICANT CHANGE UP (ref 0–0.9)
INR BLD: 1.27 RATIO — HIGH (ref 0.85–1.18)
KETONES UR-MCNC: NEGATIVE — SIGNIFICANT CHANGE UP
LEUKOCYTE ESTERASE UR-ACNC: ABNORMAL
LYMPHOCYTES # BLD AUTO: 1.12 K/UL — SIGNIFICANT CHANGE UP (ref 1–3.3)
LYMPHOCYTES # BLD AUTO: 23.2 % — SIGNIFICANT CHANGE UP (ref 13–44)
MCHC RBC-ENTMCNC: 32.9 PG — SIGNIFICANT CHANGE UP (ref 27–34)
MCHC RBC-ENTMCNC: 34.8 GM/DL — SIGNIFICANT CHANGE UP (ref 32–36)
MCV RBC AUTO: 94.4 FL — SIGNIFICANT CHANGE UP (ref 80–100)
MONOCYTES # BLD AUTO: 0.43 K/UL — SIGNIFICANT CHANGE UP (ref 0–0.9)
MONOCYTES NFR BLD AUTO: 8.9 % — SIGNIFICANT CHANGE UP (ref 2–14)
NEUTROPHILS # BLD AUTO: 3.16 K/UL — SIGNIFICANT CHANGE UP (ref 1.8–7.4)
NEUTROPHILS NFR BLD AUTO: 65.6 % — SIGNIFICANT CHANGE UP (ref 43–77)
NITRITE UR-MCNC: NEGATIVE — SIGNIFICANT CHANGE UP
PH UR: 6 — SIGNIFICANT CHANGE UP (ref 5–8)
PLATELET # BLD AUTO: 158 K/UL — SIGNIFICANT CHANGE UP (ref 150–400)
POTASSIUM SERPL-MCNC: 4.6 MMOL/L — SIGNIFICANT CHANGE UP (ref 3.5–5.3)
POTASSIUM SERPL-SCNC: 4.6 MMOL/L — SIGNIFICANT CHANGE UP (ref 3.5–5.3)
PROT UR-MCNC: NEGATIVE — SIGNIFICANT CHANGE UP
PROTHROM AB SERPL-ACNC: 14.2 SEC — HIGH (ref 9.5–13)
RBC # BLD: 4.32 M/UL — SIGNIFICANT CHANGE UP (ref 4.2–5.8)
RBC # FLD: 12.9 % — SIGNIFICANT CHANGE UP (ref 10.3–14.5)
RBC CASTS # UR COMP ASSIST: >50 /HPF (ref 0–4)
SODIUM SERPL-SCNC: 139 MMOL/L — SIGNIFICANT CHANGE UP (ref 135–145)
SP GR SPEC: 1.01 — SIGNIFICANT CHANGE UP (ref 1.01–1.02)
UROBILINOGEN FLD QL: NEGATIVE — SIGNIFICANT CHANGE UP
WBC # BLD: 4.82 K/UL — SIGNIFICANT CHANGE UP (ref 3.8–10.5)
WBC # FLD AUTO: 4.82 K/UL — SIGNIFICANT CHANGE UP (ref 3.8–10.5)
WBC UR QL: SIGNIFICANT CHANGE UP /HPF (ref 0–5)

## 2023-08-21 PROCEDURE — 86850 RBC ANTIBODY SCREEN: CPT

## 2023-08-21 PROCEDURE — 71046 X-RAY EXAM CHEST 2 VIEWS: CPT

## 2023-08-21 PROCEDURE — 85025 COMPLETE CBC W/AUTO DIFF WBC: CPT

## 2023-08-21 PROCEDURE — 71046 X-RAY EXAM CHEST 2 VIEWS: CPT | Mod: 26

## 2023-08-21 PROCEDURE — 86901 BLOOD TYPING SEROLOGIC RH(D): CPT

## 2023-08-21 PROCEDURE — 85730 THROMBOPLASTIN TIME PARTIAL: CPT

## 2023-08-21 PROCEDURE — 80048 BASIC METABOLIC PNL TOTAL CA: CPT

## 2023-08-21 PROCEDURE — 85610 PROTHROMBIN TIME: CPT

## 2023-08-21 PROCEDURE — 81001 URINALYSIS AUTO W/SCOPE: CPT

## 2023-08-21 PROCEDURE — 86900 BLOOD TYPING SEROLOGIC ABO: CPT

## 2023-08-21 PROCEDURE — 99214 OFFICE O/P EST MOD 30 MIN: CPT | Mod: 25

## 2023-08-21 PROCEDURE — 93010 ELECTROCARDIOGRAM REPORT: CPT

## 2023-08-21 PROCEDURE — 93005 ELECTROCARDIOGRAM TRACING: CPT

## 2023-08-21 PROCEDURE — 87086 URINE CULTURE/COLONY COUNT: CPT

## 2023-08-21 PROCEDURE — 36415 COLL VENOUS BLD VENIPUNCTURE: CPT

## 2023-08-21 RX ORDER — ACETYLCYSTEINE 200 MG/ML
1 VIAL (ML) MISCELLANEOUS
Refills: 0 | DISCHARGE

## 2023-08-21 RX ORDER — ASPIRIN/CALCIUM CARB/MAGNESIUM 324 MG
1 TABLET ORAL
Refills: 0 | DISCHARGE

## 2023-08-21 RX ORDER — APIXABAN 2.5 MG/1
1 TABLET, FILM COATED ORAL
Refills: 0 | DISCHARGE

## 2023-08-21 NOTE — H&P PST ADULT - NSICDXPASTMEDICALHX_GEN_ALL_CORE_FT
PAST MEDICAL HISTORY:  Acute myocardial infarction     Atrial fibrillation     CAD (coronary artery disease)     Clostridium difficile infection 2 weeks ago, now negative3    Dry eyes     High cholesterol     HTN (hypertension)     Skin cancer

## 2023-08-21 NOTE — H&P PST ADULT - NSICDXPASTSURGICALHX_GEN_ALL_CORE_FT
PAST SURGICAL HISTORY:  History of ureteroscopy     S/P lumbar laminectomy 2000    Status post Mohs surgery 2019 lip, nose    Stented coronary artery 2016

## 2023-08-21 NOTE — H&P PST ADULT - HISTORY OF PRESENT ILLNESS
Discharge instructions reviewed with patient, no questions.    Patient is 78 year old male who presents to [facility] in no acute distress. He explain how there was blood and "crystal" found in his urine. He was referred to Dr. To - CT performed which showed suspicious areas around right kidney. It was recommended at point to have surgical intervention. Patient denies dysuria, hematuria, increased frequency/urgency, fever, chills. He is scheduled to have cystoscopy, right ureteroscopy, resection of ureteral tumor, right ureteral stent placement.  Patient is 78 year old male diagnosed with right ureteral mass s/p right ureteroscopy with stent placement 6/2023; Pt said there was a lot of scar tissue and a second procedure was warranted; denies dysuia and hematuria; he presents to PST for planned right ureteroscopy resection of ureteral mass right ureteral stent exchange

## 2023-08-21 NOTE — H&P PST ADULT - ASSESSMENT
Plan:  1. PST instructions given ; NPO status/  instructions to be given by ASU   2. Pt instructed to take following meds on day of surgery:   3. Pt instructed to take routine evening medications unless indicated   4. Stop NSAIDS ( Aspirin Alev Motrin Mobic Diclofenac), herbal supplements , MVI , Vitamin fish oil 7 days prior to surgery  unless   directed by surgeon or cardiologist;   5. Medical Optimization  with    6. EZ wash instructions given & mupirocin instructions given  7. Labs EKG CXR as per surgeon request   8. Pt denies covid symptoms shortness of breath fever cough      Patient is 78 year old male diagnosed with right ureteral mass s/p right ureteroscopy with stent placement 6/2023; Pt said there was a lot of scar tissue and a second procedure was warranted; denies dysuia and hematuria; he presents to PST for planned right ureteroscopy resection of ureteral mass right ureteral stent exchange       Plan:  1. PST instructions given ; NPO status/  instructions to be given by ASU   2. Pt instructed to take following meds on day of surgery: losartan metoprolol eye drops   3. Pt instructed to take routine evening medications unless indicated   4. Stop NSAIDS ( Aspirin Alev Motrin Mobic Diclofenac), herbal supplements , MVI , Vitamin fish oil 7 days prior to surgery  unless   directed by surgeon or cardiologist;   5. Medical Optimization  with Dr Parminder Dowell and LORENA Seals   6. EZ wash instructions given & mupirocin instructions given  7. Labs EKG CXR as per surgeon request   8. Pt denies covid symptoms shortness of breath fever cough

## 2023-08-22 DIAGNOSIS — N28.89 OTHER SPECIFIED DISORDERS OF KIDNEY AND URETER: ICD-10-CM

## 2023-08-22 DIAGNOSIS — Z01.818 ENCOUNTER FOR OTHER PREPROCEDURAL EXAMINATION: ICD-10-CM

## 2023-08-22 LAB
CULTURE RESULTS: NO GROWTH — SIGNIFICANT CHANGE UP
SPECIMEN SOURCE: SIGNIFICANT CHANGE UP

## 2023-08-25 ENCOUNTER — OUTPATIENT (OUTPATIENT)
Dept: INPATIENT UNIT | Facility: HOSPITAL | Age: 78
LOS: 1 days | Discharge: HOME | End: 2023-08-25
Payer: MEDICARE

## 2023-08-25 ENCOUNTER — RESULT REVIEW (OUTPATIENT)
Age: 78
End: 2023-08-25

## 2023-08-25 ENCOUNTER — TRANSCRIPTION ENCOUNTER (OUTPATIENT)
Age: 78
End: 2023-08-25

## 2023-08-25 ENCOUNTER — APPOINTMENT (OUTPATIENT)
Dept: UROLOGY | Facility: HOSPITAL | Age: 78
End: 2023-08-25

## 2023-08-25 VITALS
HEART RATE: 55 BPM | TEMPERATURE: 98 F | DIASTOLIC BLOOD PRESSURE: 69 MMHG | SYSTOLIC BLOOD PRESSURE: 135 MMHG | OXYGEN SATURATION: 99 % | RESPIRATION RATE: 18 BRPM

## 2023-08-25 VITALS
WEIGHT: 205.03 LBS | DIASTOLIC BLOOD PRESSURE: 77 MMHG | OXYGEN SATURATION: 98 % | HEIGHT: 71 IN | RESPIRATION RATE: 16 BRPM | SYSTOLIC BLOOD PRESSURE: 137 MMHG | TEMPERATURE: 98 F | HEART RATE: 61 BPM

## 2023-08-25 DIAGNOSIS — Z98.890 OTHER SPECIFIED POSTPROCEDURAL STATES: Chronic | ICD-10-CM

## 2023-08-25 DIAGNOSIS — N28.89 OTHER SPECIFIED DISORDERS OF KIDNEY AND URETER: ICD-10-CM

## 2023-08-25 DIAGNOSIS — Z95.5 PRESENCE OF CORONARY ANGIOPLASTY IMPLANT AND GRAFT: Chronic | ICD-10-CM

## 2023-08-25 PROCEDURE — 52354 CYSTOURETERO W/BIOPSY: CPT | Mod: RT

## 2023-08-25 PROCEDURE — 52332 CYSTOSCOPY AND TREATMENT: CPT | Mod: RT

## 2023-08-25 PROCEDURE — 76000 FLUOROSCOPY <1 HR PHYS/QHP: CPT

## 2023-08-25 PROCEDURE — C2617: CPT

## 2023-08-25 PROCEDURE — 88305 TISSUE EXAM BY PATHOLOGIST: CPT

## 2023-08-25 PROCEDURE — 88305 TISSUE EXAM BY PATHOLOGIST: CPT | Mod: 26

## 2023-08-25 RX ORDER — PHENAZOPYRIDINE HCL 100 MG
1 TABLET ORAL
Qty: 9 | Refills: 0
Start: 2023-08-25 | End: 2023-08-27

## 2023-08-25 RX ORDER — FENTANYL CITRATE 50 UG/ML
25 INJECTION INTRAVENOUS
Refills: 0 | Status: DISCONTINUED | OUTPATIENT
Start: 2023-08-25 | End: 2023-08-25

## 2023-08-25 RX ORDER — ACETAMINOPHEN 500 MG
1000 TABLET ORAL ONCE
Refills: 0 | Status: DISCONTINUED | OUTPATIENT
Start: 2023-08-25 | End: 2023-08-25

## 2023-08-25 RX ORDER — APIXABAN 2.5 MG/1
1 TABLET, FILM COATED ORAL
Refills: 0 | DISCHARGE

## 2023-08-25 RX ORDER — SODIUM CHLORIDE 9 MG/ML
1000 INJECTION, SOLUTION INTRAVENOUS
Refills: 0 | Status: DISCONTINUED | OUTPATIENT
Start: 2023-08-25 | End: 2023-08-25

## 2023-08-25 RX ORDER — PHENAZOPYRIDINE HCL 100 MG
200 TABLET ORAL ONCE
Refills: 0 | Status: COMPLETED | OUTPATIENT
Start: 2023-08-25 | End: 2023-08-25

## 2023-08-25 RX ORDER — EVOLOCUMAB 140 MG/ML
140 INJECTION, SOLUTION SUBCUTANEOUS
Refills: 0 | DISCHARGE

## 2023-08-25 RX ORDER — LOSARTAN POTASSIUM 100 MG/1
1 TABLET, FILM COATED ORAL
Refills: 0 | DISCHARGE

## 2023-08-25 RX ORDER — ASPIRIN/CALCIUM CARB/MAGNESIUM 324 MG
1 TABLET ORAL
Refills: 0 | DISCHARGE

## 2023-08-25 RX ORDER — OXYCODONE HYDROCHLORIDE 5 MG/1
5 TABLET ORAL ONCE
Refills: 0 | Status: DISCONTINUED | OUTPATIENT
Start: 2023-08-25 | End: 2023-08-25

## 2023-08-25 RX ORDER — OXYBUTYNIN CHLORIDE 5 MG
5 TABLET ORAL ONCE
Refills: 0 | Status: COMPLETED | OUTPATIENT
Start: 2023-08-25 | End: 2023-08-25

## 2023-08-25 RX ORDER — METOPROLOL TARTRATE 50 MG
1 TABLET ORAL
Refills: 0 | DISCHARGE

## 2023-08-25 RX ORDER — CEFUROXIME AXETIL 250 MG
1 TABLET ORAL
Qty: 6 | Refills: 0
Start: 2023-08-25 | End: 2023-08-27

## 2023-08-25 RX ORDER — TRAMADOL HYDROCHLORIDE 50 MG/1
1 TABLET ORAL
Qty: 12 | Refills: 0
Start: 2023-08-25 | End: 2023-08-27

## 2023-08-25 RX ORDER — LOPERAMIDE HCL 2 MG
1 TABLET ORAL
Refills: 0 | DISCHARGE

## 2023-08-25 RX ORDER — SACCHAROMYCES BOULARDII 250 MG
2 POWDER IN PACKET (EA) ORAL
Refills: 0 | DISCHARGE

## 2023-08-25 RX ORDER — OXYBUTYNIN CHLORIDE 5 MG
1 TABLET ORAL
Qty: 30 | Refills: 0
Start: 2023-08-25 | End: 2023-09-03

## 2023-08-25 RX ADMIN — Medication 5 MILLIGRAM(S): at 08:47

## 2023-08-25 RX ADMIN — Medication 200 MILLIGRAM(S): at 08:47

## 2023-08-25 NOTE — ASU DISCHARGE PLAN (ADULT/PEDIATRIC) - CARE PROVIDER_API CALL
Aamir To Ton  Urology  [address] [place] [address], [address]  [place] [address]  Phone: [phone]  Fax: [phone]  Follow Up Time: 1 week

## 2023-08-25 NOTE — ASU PATIENT PROFILE, ADULT - VISION (WITH CORRECTIVE LENSES IF THE PATIENT USUALLY WEARS THEM):
Normal vision: sees adequately in most situations; can see medication labels, newsprint Birth Control Pills Counseling: Birth Control Pill Counseling: I discussed with the patient the potential side effects of OCPs including but not limited to increased risk of stroke, heart attack, thrombophlebitis, deep venous thrombosis, hepatic adenomas, breast changes, GI upset, headaches, and depression.  The patient verbalized understanding of the proper use and possible adverse effects of OCPs. All of the patient's questions and concerns were addressed.

## 2023-08-25 NOTE — BRIEF OPERATIVE NOTE - OPERATION/FINDINGS
Cystoscopy, RIGHT ureteroscopy, biopsy and laser ablation of ureteral mass, RIGHT ureteral stent exchange. papillary and sessile lesion seen in the distal ureter. Biopsied and partially ablated. Endoscopic clearance of mass was not possible.

## 2023-08-25 NOTE — ASU PATIENT PROFILE, ADULT - FALL HARM RISK - UNIVERSAL INTERVENTIONS
Bed in lowest position, wheels locked, appropriate side rails in place/Call bell, personal items and telephone in reach/Instruct patient to call for assistance before getting out of bed or chair/Non-slip footwear when patient is out of bed/[place] to call system/Physically safe environment - no spills, clutter or unnecessary equipment/Purposeful Proactive Rounding/Room/bathroom lighting operational, light cord in reach

## 2023-08-25 NOTE — BRIEF OPERATIVE NOTE - NSICDXBRIEFPROCEDURE_GEN_ALL_CORE_FT
PROCEDURES:  Ureteroscopy, with neoplasm ablation using laser 25-Aug-2023 08:24:11  Aamir To  Cystoscopy with stent placement 25-Aug-2023 08:24:19  Aamir To

## 2023-08-25 NOTE — ASU DISCHARGE PLAN (ADULT/PEDIATRIC) - PROCEDURE
Cystoscopy, LEFT ureteroscopy, biopsy and laser ablation of ureteral lesions, RIGHT ureteral stent placement

## 2023-08-31 ENCOUNTER — APPOINTMENT (OUTPATIENT)
Dept: UROLOGY | Facility: CLINIC | Age: 78
End: 2023-08-31
Payer: MEDICARE

## 2023-08-31 VITALS
HEART RATE: 66 BPM | HEIGHT: 71 IN | SYSTOLIC BLOOD PRESSURE: 135 MMHG | DIASTOLIC BLOOD PRESSURE: 78 MMHG | WEIGHT: 200 LBS | RESPIRATION RATE: 15 BRPM | OXYGEN SATURATION: 95 % | BODY MASS INDEX: 28 KG/M2

## 2023-08-31 PROBLEM — C44.90 UNSPECIFIED MALIGNANT NEOPLASM OF SKIN, UNSPECIFIED: Chronic | Status: ACTIVE | Noted: 2023-08-21

## 2023-08-31 PROBLEM — I25.10 ATHEROSCLEROTIC HEART DISEASE OF NATIVE CORONARY ARTERY WITHOUT ANGINA PECTORIS: Chronic | Status: ACTIVE | Noted: 2023-08-21

## 2023-08-31 LAB — SURGICAL PATHOLOGY STUDY: SIGNIFICANT CHANGE UP

## 2023-08-31 PROCEDURE — 99213 OFFICE O/P EST LOW 20 MIN: CPT

## 2023-08-31 NOTE — HISTORY OF PRESENT ILLNESS
[FreeTextEntry1] : 78 year old man  with complaint of gross hematuria. This began 4/2023. He had URI, presented to urgent care cenrter and was started on medication, he does not recall which. Then developed drown urine with remained dark for days. UA sent by Dr Dowell 3000 RBCs/hpf. UCx was neg. Urine has cleared somewhat but still brown to red. Mild frequency, weak stream, nocturia x1. not bothersome. No gross hematuria, no dysuria, no frequency, no urgency, no hesitancy, no straining. No incontinence.  No fevers, no chills, no nausea, no vomiting, no flank pain. No family history contributory to gross hematuria.   06/16/2023: Patient presents for follow up. He is without complaints. No hematuria. No new or bothersome LUTS. 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   07/13/2023: Patient presents for follow up. He underwent attempted URS ureteral lesion biopsy and ablation, but ureter was stenotic and scope could not be passed into proximal ureter. Stent placed. He had post op hematuria, but now resolved. Tolerating stent well. No acute or bothersome LUTS.  08/31/2023: Patient presents for follow up. He is s/p ureteroscopy with ureteral biopsy and stent exchange. Feeling well, tolerating stent well. Pathology pending.   SERUM CR  0.99   04/2023

## 2023-08-31 NOTE — ASSESSMENT
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 Now s/p biopsy. Discussed if pathology confirms malignancy, standard of care is nephroureterectomy. Could also consider endoscopic management but I suspect this would not be possible. Will call pt when pathology results are available.

## 2023-09-01 DIAGNOSIS — I10 ESSENTIAL (PRIMARY) HYPERTENSION: ICD-10-CM

## 2023-09-01 DIAGNOSIS — I25.10 ATHEROSCLEROTIC HEART DISEASE OF NATIVE CORONARY ARTERY WITHOUT ANGINA PECTORIS: ICD-10-CM

## 2023-09-01 DIAGNOSIS — I48.91 UNSPECIFIED ATRIAL FIBRILLATION: ICD-10-CM

## 2023-09-01 DIAGNOSIS — Z79.82 LONG TERM (CURRENT) USE OF ASPIRIN: ICD-10-CM

## 2023-09-01 DIAGNOSIS — N34.1 NONSPECIFIC URETHRITIS: ICD-10-CM

## 2023-09-01 DIAGNOSIS — N28.9 DISORDER OF KIDNEY AND URETER, UNSPECIFIED: ICD-10-CM

## 2023-09-01 DIAGNOSIS — Z87.891 PERSONAL HISTORY OF NICOTINE DEPENDENCE: ICD-10-CM

## 2023-09-28 ENCOUNTER — APPOINTMENT (OUTPATIENT)
Dept: UROLOGY | Facility: CLINIC | Age: 78
End: 2023-09-28
Payer: MEDICARE

## 2023-09-28 PROCEDURE — 52315 CYSTOSCOPY AND TREATMENT: CPT

## 2023-11-03 NOTE — ASU DISCHARGE PLAN (ADULT/PEDIATRIC) - PROVIDER TOKENS
Addended by: Keeley Moore on: 11/2/2023 08:09 PM     Modules accepted: Orders
PROVIDER:[TOKEN:[[id number]],FOLLOWUP:[1 week]]

## 2023-11-28 ENCOUNTER — OUTPATIENT (OUTPATIENT)
Dept: OUTPATIENT SERVICES | Facility: HOSPITAL | Age: 78
LOS: 1 days | End: 2023-11-28
Payer: MEDICARE

## 2023-11-28 ENCOUNTER — APPOINTMENT (OUTPATIENT)
Dept: ULTRASOUND IMAGING | Facility: CLINIC | Age: 78
End: 2023-11-28
Payer: MEDICARE

## 2023-11-28 ENCOUNTER — RESULT REVIEW (OUTPATIENT)
Age: 78
End: 2023-11-28

## 2023-11-28 DIAGNOSIS — Z95.5 PRESENCE OF CORONARY ANGIOPLASTY IMPLANT AND GRAFT: Chronic | ICD-10-CM

## 2023-11-28 DIAGNOSIS — Z98.890 OTHER SPECIFIED POSTPROCEDURAL STATES: Chronic | ICD-10-CM

## 2023-11-28 DIAGNOSIS — N50.89 OTHER SPECIFIED DISORDERS OF THE MALE GENITAL ORGANS: ICD-10-CM

## 2023-11-28 DIAGNOSIS — Z00.8 ENCOUNTER FOR OTHER GENERAL EXAMINATION: ICD-10-CM

## 2023-11-28 PROCEDURE — 93975 VASCULAR STUDY: CPT

## 2023-11-28 PROCEDURE — 93975 VASCULAR STUDY: CPT | Mod: 26

## 2023-11-28 PROCEDURE — 76870 US EXAM SCROTUM: CPT

## 2023-11-28 PROCEDURE — 76870 US EXAM SCROTUM: CPT | Mod: 26

## 2023-12-07 ENCOUNTER — APPOINTMENT (OUTPATIENT)
Dept: UROLOGY | Facility: CLINIC | Age: 78
End: 2023-12-07
Payer: MEDICARE

## 2023-12-07 DIAGNOSIS — N28.89 OTHER SPECIFIED DISORDERS OF KIDNEY AND URETER: ICD-10-CM

## 2023-12-07 PROCEDURE — 99213 OFFICE O/P EST LOW 20 MIN: CPT

## 2023-12-08 PROBLEM — N28.89 URETERAL MASS: Status: ACTIVE | Noted: 2023-06-16

## 2024-02-28 ENCOUNTER — APPOINTMENT (OUTPATIENT)
Dept: FAMILY MEDICINE | Facility: CLINIC | Age: 79
End: 2024-02-28

## 2024-04-06 NOTE — H&P PST ADULT - NSICDXPASTSURGICALHX_GEN_ALL_CORE_FT
Connected to efm.    PAST SURGICAL HISTORY:  S/P lumbar laminectomy     Status post Mohs surgery     Stented coronary artery

## 2024-09-12 ENCOUNTER — APPOINTMENT (OUTPATIENT)
Dept: FAMILY MEDICINE | Facility: CLINIC | Age: 79
End: 2024-09-12
Payer: MEDICARE

## 2024-09-12 VITALS
HEART RATE: 72 BPM | HEIGHT: 71 IN | SYSTOLIC BLOOD PRESSURE: 120 MMHG | DIASTOLIC BLOOD PRESSURE: 66 MMHG | OXYGEN SATURATION: 98 % | BODY MASS INDEX: 26.18 KG/M2 | WEIGHT: 187 LBS | TEMPERATURE: 97.2 F

## 2024-09-12 DIAGNOSIS — C66.9 MALIGNANT NEOPLASM OF UNSPECIFIED URETER: ICD-10-CM

## 2024-09-12 DIAGNOSIS — G62.9 POLYNEUROPATHY, UNSPECIFIED: ICD-10-CM

## 2024-09-12 PROCEDURE — G0008: CPT

## 2024-09-12 PROCEDURE — 99214 OFFICE O/P EST MOD 30 MIN: CPT

## 2024-09-12 PROCEDURE — 90662 IIV NO PRSV INCREASED AG IM: CPT

## 2024-09-12 RX ORDER — WHITE PETROLATUM 1.75 OZ
OINTMENT TOPICAL
Refills: 0 | Status: ACTIVE | COMMUNITY

## 2024-09-12 RX ORDER — GABAPENTIN 100 MG
100 TABLET ORAL
Refills: 0 | Status: ACTIVE | COMMUNITY

## 2024-09-12 RX ORDER — CHOLECALCIFEROL (VITAMIN D3) 1250 MCG
1.25 MG CAPSULE ORAL
Refills: 0 | Status: ACTIVE | COMMUNITY

## 2024-09-12 RX ORDER — PEMBROLIZUMAB 25 MG/ML
100 INJECTION, SOLUTION INTRAVENOUS
Refills: 0 | Status: ACTIVE | COMMUNITY

## 2024-09-12 RX ORDER — FLUOCINONIDE 0.5 MG/G
0.05 CREAM TOPICAL
Refills: 0 | Status: ACTIVE | COMMUNITY

## 2024-09-12 RX ORDER — SACCHAROMYCES BOULARDII 50 MG
CAPSULE ORAL
Refills: 0 | Status: ACTIVE | COMMUNITY

## 2024-09-12 RX ORDER — FAMOTIDINE 20 MG/1
20 TABLET, FILM COATED ORAL
Refills: 0 | Status: ACTIVE | COMMUNITY

## 2024-09-12 RX ORDER — ENFORTUMAB VEDOTIN 20 MG/2.3ML
20 INJECTION, POWDER, LYOPHILIZED, FOR SOLUTION INTRAVENOUS
Refills: 0 | Status: ACTIVE | COMMUNITY

## 2024-09-12 NOTE — HISTORY OF PRESENT ILLNESS
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  These pins-and-needles not really painful.  Will give therapeutic trial of gabapentin 100 to 400 mg every 6 hours as needed.  [facility]  Hypertension well-controlled with losartan and metoprolol.  On Repatha for elevated cholesterol  Atrial fibrillation controlled with Eliquis [FreeTextEntry1] : check up

## 2024-10-02 RX ORDER — GABAPENTIN 100 MG/1
100 CAPSULE ORAL
Qty: 180 | Refills: 3 | Status: ACTIVE | COMMUNITY
Start: 2024-10-02 | End: 1900-01-01

## 2024-10-04 ENCOUNTER — TRANSCRIPTION ENCOUNTER (OUTPATIENT)
Age: 79
End: 2024-10-04

## 2024-10-09 NOTE — H&P PST ADULT - NSANTHTOTALSCORECAL_ENT_A_CORE
- HgbA1c 7.5 down from 10.4  - reinforced importance of aggressive BS control  - DM managed by primary team    2

## 2025-01-21 ENCOUNTER — APPOINTMENT (OUTPATIENT)
Dept: NEUROLOGY | Facility: CLINIC | Age: 80
End: 2025-01-21
Payer: MEDICARE

## 2025-01-21 VITALS
TEMPERATURE: 98.2 F | HEIGHT: 70 IN | HEART RATE: 73 BPM | WEIGHT: 205 LBS | BODY MASS INDEX: 29.35 KG/M2 | SYSTOLIC BLOOD PRESSURE: 132 MMHG | DIASTOLIC BLOOD PRESSURE: 70 MMHG

## 2025-01-21 PROCEDURE — 99203 OFFICE O/P NEW LOW 30 MIN: CPT

## 2025-01-21 RX ORDER — LOSARTAN POTASSIUM 100 MG/1
100 TABLET, FILM COATED ORAL
Refills: 0 | Status: ACTIVE | COMMUNITY

## 2025-01-21 RX ORDER — FAMOTIDINE 10 MG/1
10 TABLET, FILM COATED ORAL
Refills: 0 | Status: ACTIVE | COMMUNITY

## 2025-01-21 RX ORDER — CYCLOSPORINE 0.5 MG/ML
0.05 EMULSION OPHTHALMIC
Refills: 0 | Status: ACTIVE | COMMUNITY

## 2025-01-21 RX ORDER — BUTENAFINE HYDROCHLORIDE 10 MG/G
CREAM TOPICAL
Refills: 0 | Status: ACTIVE | COMMUNITY

## 2025-01-22 ENCOUNTER — NON-APPOINTMENT (OUTPATIENT)
Age: 80
End: 2025-01-22